# Patient Record
Sex: FEMALE | Race: WHITE | NOT HISPANIC OR LATINO | Employment: UNEMPLOYED | URBAN - METROPOLITAN AREA
[De-identification: names, ages, dates, MRNs, and addresses within clinical notes are randomized per-mention and may not be internally consistent; named-entity substitution may affect disease eponyms.]

---

## 2021-04-23 ENCOUNTER — IMMUNIZATION (OUTPATIENT)
Dept: NURSING | Facility: CLINIC | Age: 31
End: 2021-04-23
Payer: COMMERCIAL

## 2021-04-23 PROCEDURE — 91300 PR COVID VAC PFIZER DIL RECON 30 MCG/0.3 ML IM: CPT

## 2021-04-23 PROCEDURE — 0001A PR COVID VAC PFIZER DIL RECON 30 MCG/0.3 ML IM: CPT

## 2021-05-02 ENCOUNTER — HEALTH MAINTENANCE LETTER (OUTPATIENT)
Age: 31
End: 2021-05-02

## 2021-05-14 ENCOUNTER — IMMUNIZATION (OUTPATIENT)
Dept: NURSING | Facility: CLINIC | Age: 31
End: 2021-05-14
Attending: INTERNAL MEDICINE
Payer: COMMERCIAL

## 2021-05-14 PROCEDURE — 91300 PR COVID VAC PFIZER DIL RECON 30 MCG/0.3 ML IM: CPT

## 2021-05-14 PROCEDURE — 0002A PR COVID VAC PFIZER DIL RECON 30 MCG/0.3 ML IM: CPT

## 2021-10-17 ENCOUNTER — HEALTH MAINTENANCE LETTER (OUTPATIENT)
Age: 31
End: 2021-10-17

## 2021-12-15 ENCOUNTER — IMMUNIZATION (OUTPATIENT)
Dept: NURSING | Facility: CLINIC | Age: 31
End: 2021-12-15
Payer: COMMERCIAL

## 2021-12-15 PROCEDURE — 91300 PR COVID VAC PFIZER DIL RECON 30 MCG/0.3 ML IM: CPT

## 2021-12-15 PROCEDURE — 0004A PR COVID VAC PFIZER DIL RECON 30 MCG/0.3 ML IM: CPT

## 2022-05-29 ENCOUNTER — HEALTH MAINTENANCE LETTER (OUTPATIENT)
Age: 32
End: 2022-05-29

## 2022-08-15 ENCOUNTER — OFFICE VISIT (OUTPATIENT)
Dept: FAMILY MEDICINE | Facility: CLINIC | Age: 32
End: 2022-08-15
Payer: COMMERCIAL

## 2022-08-15 VITALS
WEIGHT: 255 LBS | HEIGHT: 65 IN | BODY MASS INDEX: 42.49 KG/M2 | OXYGEN SATURATION: 97 % | SYSTOLIC BLOOD PRESSURE: 106 MMHG | DIASTOLIC BLOOD PRESSURE: 64 MMHG | HEART RATE: 78 BPM | RESPIRATION RATE: 16 BRPM | TEMPERATURE: 98 F

## 2022-08-15 DIAGNOSIS — Z13.220 LIPID SCREENING: ICD-10-CM

## 2022-08-15 DIAGNOSIS — Z86.39 HISTORY OF VITAMIN D DEFICIENCY: ICD-10-CM

## 2022-08-15 DIAGNOSIS — I78.1 NEVUS, NON-NEOPLASTIC: ICD-10-CM

## 2022-08-15 DIAGNOSIS — D68.59 PROTEIN C DEFICIENCY (H): ICD-10-CM

## 2022-08-15 DIAGNOSIS — L91.8 SKIN TAG: ICD-10-CM

## 2022-08-15 DIAGNOSIS — Z13.1 SCREENING FOR DIABETES MELLITUS: ICD-10-CM

## 2022-08-15 DIAGNOSIS — Z12.4 CERVICAL CANCER SCREENING: ICD-10-CM

## 2022-08-15 DIAGNOSIS — Z00.00 ROUTINE GENERAL MEDICAL EXAMINATION AT A HEALTH CARE FACILITY: Primary | ICD-10-CM

## 2022-08-15 DIAGNOSIS — Z83.2 FAMILY HISTORY OF PROTEIN C DEFICIENCY: Primary | ICD-10-CM

## 2022-08-15 DIAGNOSIS — Z11.59 NEED FOR HEPATITIS C SCREENING TEST: ICD-10-CM

## 2022-08-15 PROBLEM — B00.1 RECURRENT HERPES LABIALIS: Status: ACTIVE | Noted: 2018-06-27

## 2022-08-15 PROBLEM — D17.20 LIPOMA OF UPPER EXTREMITY: Status: ACTIVE | Noted: 2018-06-28

## 2022-08-15 LAB
CHOLEST SERPL-MCNC: 156 MG/DL
DEPRECATED CALCIDIOL+CALCIFEROL SERPL-MC: 23 UG/L (ref 20–75)
FASTING STATUS PATIENT QL REPORTED: YES
GLUCOSE SERPL-MCNC: 100 MG/DL (ref 70–99)
HCV AB SERPL QL IA: NONREACTIVE
HDLC SERPL-MCNC: 31 MG/DL
LDLC SERPL CALC-MCNC: 105 MG/DL
NONHDLC SERPL-MCNC: 125 MG/DL
TRIGL SERPL-MCNC: 101 MG/DL

## 2022-08-15 PROCEDURE — 82306 VITAMIN D 25 HYDROXY: CPT | Performed by: FAMILY MEDICINE

## 2022-08-15 PROCEDURE — 87624 HPV HI-RISK TYP POOLED RSLT: CPT | Performed by: FAMILY MEDICINE

## 2022-08-15 PROCEDURE — 99385 PREV VISIT NEW AGE 18-39: CPT | Performed by: FAMILY MEDICINE

## 2022-08-15 PROCEDURE — 80061 LIPID PANEL: CPT | Performed by: FAMILY MEDICINE

## 2022-08-15 PROCEDURE — G0145 SCR C/V CYTO,THINLAYER,RESCR: HCPCS | Performed by: FAMILY MEDICINE

## 2022-08-15 PROCEDURE — 82947 ASSAY GLUCOSE BLOOD QUANT: CPT | Performed by: FAMILY MEDICINE

## 2022-08-15 PROCEDURE — 36415 COLL VENOUS BLD VENIPUNCTURE: CPT | Performed by: FAMILY MEDICINE

## 2022-08-15 PROCEDURE — 86803 HEPATITIS C AB TEST: CPT | Performed by: FAMILY MEDICINE

## 2022-08-15 RX ORDER — ASPIRIN 81 MG/1
81 TABLET, CHEWABLE ORAL DAILY
COMMUNITY
End: 2022-11-08

## 2022-08-15 ASSESSMENT — ENCOUNTER SYMPTOMS
SORE THROAT: 0
HEMATURIA: 0
PALPITATIONS: 0
EYE PAIN: 0
NERVOUS/ANXIOUS: 0
SHORTNESS OF BREATH: 0
WEAKNESS: 0
HEARTBURN: 0
DIZZINESS: 0
CHILLS: 0
HEMATOCHEZIA: 0
DYSURIA: 0
MYALGIAS: 0
JOINT SWELLING: 0
ABDOMINAL PAIN: 0
COUGH: 0
FREQUENCY: 0
NAUSEA: 0
BREAST MASS: 0
ARTHRALGIAS: 0
DIARRHEA: 0
PARESTHESIAS: 0
FEVER: 0
CONSTIPATION: 0
HEADACHES: 0

## 2022-08-15 NOTE — PROGRESS NOTES
SUBJECTIVE:   CC: Jailyn Mendenhall is an 31 year old woman who presents for preventive health visit.     \  Patient has been advised of split billing requirements and indicates understanding: Yes  Healthy Habits:     Getting at least 3 servings of Calcium per day:  NO    Bi-annual eye exam:  NO    Dental care twice a year:  Yes    Sleep apnea or symptoms of sleep apnea:  None    Diet:  Other    Frequency of exercise:  2-3 days/week    Duration of exercise:  30-45 minutes    Taking medications regularly:  Yes    Medication side effects:  None    PHQ-2 Total Score: 0    Additional concerns today:  Yes    3 children, age 20 months, 3 year, 5 year  Has a few moles, one on left back of her arm, one in sacral area, and two on her back.     History of protein C deficiency  Father history of VTE- she was recommended to have testing  Has previously seen hematologist but has not yet had testing post-pregnancy as recommended    Asthma, mild intermittent  Had to use albuterol one time during one of her pregnancies    Recurrent cold sores  Uses acyclovir prn    Using condoms  Her partner does plan to have vasectomy in the future      Today's PHQ-2 Score:   PHQ-2 ( 1999 [employer]) 8/15/2022   Q1: Little interest or pleasure in doing things 0   Q2: Feeling down, depressed or hopeless 0   PHQ-2 Score 0   Q1: Little interest or pleasure in doing things Not at all   Q2: Feeling down, depressed or hopeless Not at all   PHQ-2 Score 0     Abuse: Current or Past (Physical, Sexual or Emotional) - No  Do you feel safe in your environment? Yes    Have you ever done Advance Care Planning? (For example, a Health Directive, POLST, or a discussion with a medical provider or your loved ones about your wishes): No, advance care planning information given to patient to review.  Patient plans to discuss their wishes with loved ones or provider.      Social History     Tobacco Use     Smoking status: Not on file     Smokeless tobacco: Not on file  "  Substance Use Topics     Alcohol use: Not on file     If you drink alcohol do you typically have >3 drinks per day or >7 drinks per week? No    Alcohol Use 8/15/2022   Prescreen: >3 drinks/day or >7 drinks/week? No   No flowsheet data found.    Reviewed orders with patient.  Reviewed health maintenance and updated orders accordingly - Yes        FHS-7:   Breast CA Risk Assessment (FHS-7) 8/15/2022   Did any of your first-degree relatives have breast or ovarian cancer? No   Did any of your relatives have bilateral breast cancer? No   Did any man in your family have breast cancer? No   Did any woman in your family have breast and ovarian cancer? No   Did any woman in your family have breast cancer before age 50 y? No   Do you have 2 or more relatives with breast and/or ovarian cancer? No   Do you have 2 or more relatives with breast and/or bowel cancer? No       History of abnormal Pap smear: NO - age 30-65 PAP every 5 years with negative HPV co-testing recommended     Reviewed and updated as needed this visit by clinical staff   Tobacco  Allergies  Meds    Surg Hx             Reviewed and updated as needed this visit by Provider   Tobacco  Allergies  Meds  Problems  Med Hx  Surg Hx  Fam Hx             Review of Systems       OBJECTIVE:   /64   Pulse 78   Temp 98  F (36.7  C) (Oral)   Resp 16   Ht 1.651 m (5' 5\")   Wt 115.7 kg (255 lb)   LMP 07/24/2022   SpO2 97%   BMI 42.43 kg/m    Physical Exam  General: Alert, no distress  Eyes: sclera normal  HENT: Normocephalic, no pharyngeal erythema, MMM.  Heart: Normal S1 and S2, regular rhythm. No murmurs, gallops, rubs.  Lungs: CTA bilaterally, no wheezes, no crackles, no rhonchi.  Neuro: No focal deficits   (female): External genitalia is without lesions. Introitus is normal, vaginal walls pink and moist without lesions or evidence of trauma. No cervical lesions or discharge  Extremities: Peripheral pulses intact, no peripheral edema.  Skin: " "Ovoid hyperpigmented macules on back, one in sacral area, left posterior upper arm with skin tag  Psych: Normal mood and affect.      ASSESSMENT/PLAN:     Jailyn was seen today for physical.    Diagnoses and all orders for this visit:    Routine general medical examination at a health care facility: Establish care physical- reviewed all health history and updated in chart.    Need for hepatitis C screening test  -     Hepatitis C Screen Reflex to HCV RNA Quant and Genotype; Future  -     Hepatitis C Screen Reflex to HCV RNA Quant and Genotype    Cervical cancer screening: No history of abnormal pap. Normal pelvic exam. If normal, repeat in 5 years.  -     Pap Screen with HPV - recommended age 30 - 65 years    Lipid screening  -     Lipid panel reflex to direct LDL Fasting; Future  -     Lipid panel reflex to direct LDL Fasting    Screening for diabetes mellitus  -     Glucose; Future  -     Glucose    Protein C deficiency (H): Family history of protein C deficiency in her father. No personal history of VTE. Refer to hematology- she was recommended to have repeat testing after her pregnancy.   -     Adult Oncology/Hematology  Referral; Future    History of vitamin D deficiency  -     Vitamin D deficiency screening; Future  -     Vitamin D deficiency screening    Nevus, non-neoplastic  Skin tag: Benign appearing- offered reassurance. Continue to monitor annually at visits.          COUNSELING:  Reviewed preventive health counseling, as reflected in patient instructions    Estimated body mass index is 42.43 kg/m  as calculated from the following:    Height as of this encounter: 1.651 m (5' 5\").    Weight as of this encounter: 115.7 kg (255 lb).      She reports that she has never smoked. She has never used smokeless tobacco.      Counseling Resources:  ATP IV Guidelines  Pooled Cohorts Equation Calculator  Breast Cancer Risk Calculator  BRCA-Related Cancer Risk Assessment: FHS-7 Tool  FRAX Risk " Assessment  ICSI Preventive Guidelines  Dietary Guidelines for Americans, 2010  USDA's MyPlate  ASA Prophylaxis  Lung CA Screening    Lyric Tenorio MD  [facility]

## 2022-08-18 LAB
BKR LAB AP GYN ADEQUACY: NORMAL
BKR LAB AP GYN INTERPRETATION: NORMAL
BKR LAB AP HPV REFLEX: NORMAL
BKR LAB AP PREVIOUS ABNORMAL: NORMAL
PATH REPORT.COMMENTS IMP SPEC: NORMAL
PATH REPORT.COMMENTS IMP SPEC: NORMAL
PATH REPORT.RELEVANT HX SPEC: NORMAL

## 2022-08-22 LAB
HUMAN PAPILLOMA VIRUS 16 DNA: NEGATIVE
HUMAN PAPILLOMA VIRUS 18 DNA: NEGATIVE
HUMAN PAPILLOMA VIRUS FINAL DIAGNOSIS: NORMAL
HUMAN PAPILLOMA VIRUS OTHER HR: NEGATIVE

## 2022-10-03 ENCOUNTER — HEALTH MAINTENANCE LETTER (OUTPATIENT)
Age: 32
End: 2022-10-03

## 2022-10-11 ENCOUNTER — MYC MEDICAL ADVICE (OUTPATIENT)
Dept: FAMILY MEDICINE | Facility: CLINIC | Age: 32
End: 2022-10-11

## 2022-10-11 NOTE — TELEPHONE ENCOUNTER
Form printed and in Dr. Tenorio's blue folder to be signed.     TAVO GuzmanN, RN  [facility]

## 2022-11-08 ENCOUNTER — OFFICE VISIT (OUTPATIENT)
Dept: INTERNAL MEDICINE | Facility: CLINIC | Age: 32
End: 2022-11-08
Payer: COMMERCIAL

## 2022-11-08 VITALS
HEIGHT: 65 IN | DIASTOLIC BLOOD PRESSURE: 66 MMHG | SYSTOLIC BLOOD PRESSURE: 108 MMHG | WEIGHT: 254.6 LBS | BODY MASS INDEX: 42.42 KG/M2 | HEART RATE: 76 BPM

## 2022-11-08 DIAGNOSIS — Z01.818 PREOPERATIVE EXAMINATION: Primary | ICD-10-CM

## 2022-11-08 DIAGNOSIS — N20.1 URETEROLITHIASIS: ICD-10-CM

## 2022-11-08 DIAGNOSIS — J45.20 MILD INTERMITTENT ASTHMA WITHOUT COMPLICATION: ICD-10-CM

## 2022-11-08 DIAGNOSIS — D68.8 HEREDITARY PROTEIN C DEFICIENCY (H): ICD-10-CM

## 2022-11-08 LAB — HCG UR QL: NEGATIVE

## 2022-11-08 PROCEDURE — 81025 URINE PREGNANCY TEST: CPT | Performed by: NURSE PRACTITIONER

## 2022-11-08 PROCEDURE — 99214 OFFICE O/P EST MOD 30 MIN: CPT | Performed by: NURSE PRACTITIONER

## 2022-11-08 RX ORDER — ONDANSETRON 4 MG/1
4 TABLET, ORALLY DISINTEGRATING ORAL
COMMUNITY
Start: 2022-10-29 | End: 2023-11-06

## 2022-11-08 RX ORDER — MULTIVITAMIN
1 TABLET ORAL EVERY MORNING
COMMUNITY

## 2022-11-08 RX ORDER — TAMSULOSIN HYDROCHLORIDE 0.4 MG/1
0.4 CAPSULE ORAL DAILY
COMMUNITY
Start: 2022-10-29 | End: 2023-10-29

## 2022-11-08 RX ORDER — OXYCODONE HYDROCHLORIDE 5 MG/1
5 TABLET ORAL
COMMUNITY
Start: 2022-10-29 | End: 2023-11-06

## 2022-11-08 RX ORDER — IBUPROFEN 600 MG/1
600 TABLET, FILM COATED ORAL
COMMUNITY
Start: 2022-10-29

## 2022-11-08 ASSESSMENT — ASTHMA QUESTIONNAIRES
ACT_TOTALSCORE: 25
ACT_TOTALSCORE: 25
QUESTION_5 LAST FOUR WEEKS HOW WOULD YOU RATE YOUR ASTHMA CONTROL: COMPLETELY CONTROLLED
QUESTION_3 LAST FOUR WEEKS HOW OFTEN DID YOUR ASTHMA SYMPTOMS (WHEEZING, COUGHING, SHORTNESS OF BREATH, CHEST TIGHTNESS OR PAIN) WAKE YOU UP AT NIGHT OR EARLIER THAN USUAL IN THE MORNING: NOT AT ALL
QUESTION_1 LAST FOUR WEEKS HOW MUCH OF THE TIME DID YOUR ASTHMA KEEP YOU FROM GETTING AS MUCH DONE AT WORK, SCHOOL OR AT HOME: NONE OF THE TIME
QUESTION_2 LAST FOUR WEEKS HOW OFTEN HAVE YOU HAD SHORTNESS OF BREATH: NOT AT ALL
QUESTION_4 LAST FOUR WEEKS HOW OFTEN HAVE YOU USED YOUR RESCUE INHALER OR NEBULIZER MEDICATION (SUCH AS ALBUTEROL): NOT AT ALL

## 2022-11-08 NOTE — PROGRESS NOTES
[address] [address]  Phone: [phone]  Fax: [phone]  Primary Provider: No Ref-Primary, Physician  Pre-op Performing Provider: ALENA QUINONEZ      PREOPERATIVE EVALUATION:  Today's date: 11/8/2022    Jailyn Mendenhall is a 32 year old female who presents for a preoperative evaluation.    Surgical Information:  Surgery/Procedure: HOLMIUM LASER CYSTOSCOPIC REMOVAL URETERAL STONE  Surgery Location: [facility]  Surgeon:    Surgery Date: 11/11/22  Time of Surgery: TBD  Where patient plans to recover: At home with family  Fax number for surgical facility: [phone]    Type of Anesthesia Anticipated: to be determined    Assessment & Plan     The proposed surgical procedure is considered INTERMEDIATE risk.    Problem List Items Addressed This Visit        Respiratory    Mild intermittent asthma     Stable          Relevant Medications    ipratropium-albuterol (COMBIVENT RESPIMAT)  MCG/ACT inhaler       Hematologic    Hereditary protein C deficiency (H), asymptomatic      No personal history of VTE        Other Visit Diagnoses     Preoperative examination    -  Primary    Relevant Orders    HCG qualitative urine (Completed)    Ureterolithiasis        Relevant Medications    oxyCODONE (ROXICODONE) 5 MG tablet        Risks:   - Note increased VTE risk related to hereditary protein C deficiency    Medication Instructions:  - Continue to hold aspirin until after procedure        RECOMMENDATION:  APPROVAL GIVEN to proceed with proposed procedure, without further diagnostic evaluation.        Subjective     HPI related to upcoming procedure: Jailyn Mendenhall has a PMH of ureterolithiasis and was seen in the ED on 10/29 with a c/o left flank pain, nausea. Imaging showed a 5 mm stone.  She has been straining her urine consistently and has not passed the stone.  She is scheduled to undergo ureteroscopy.  She no longer has flank  pain, no hematuria.    Asthma has been well controlled, no recent use of albuterol inhaler.    Preop Questions 11/8/2022   1. Have you ever had a heart attack or stroke? No   2. Have you ever had surgery on your heart or blood vessels, such as a stent placement, a coronary artery bypass, or surgery on an artery in your head, neck, heart, or legs? No   3. Do you have chest pain with activity? No   4. Do you have a history of  heart failure? No   5. Do you currently have a cold, bronchitis or symptoms of other infection? No   6. Do you have a cough, shortness of breath, or wheezing? No   7. Do you or anyone in your family have previous history of blood clots? YES - Her father has Protein C deficiency and she also has Protein C deficiency. Takes aspirin typically but stopped about 2 weeks ago. She does not have a personal history of VTE    8. Do you or does anyone in your family have a serious bleeding problem such as prolonged bleeding following surgeries or cuts? YES - her father has + bleeding dyscrasia associated with anticoagulation therapy taken for Protein C deficiency    9. Have you ever had problems with anemia or been told to take iron pills? No   10. Have you had any abnormal blood loss such as black, tarry or bloody stools, or abnormal vaginal bleeding? No   11. Have you ever had a blood transfusion? No   12. Are you willing to have a blood transfusion if it is medically needed before, during, or after your surgery? Yes   13. Have you or any of your relatives ever had problems with anesthesia? No   14. Do you have sleep apnea, excessive snoring or daytime drowsiness? No   15. Do you have any artifical heart valves or other implanted medical devices like a pacemaker, defibrillator, or continuous glucose monitor? No   16. Do you have artificial joints? No   17. Are you allergic to latex? No    18. Is there any chance that you may be pregnant? No        Preoperative Review of :   reviewed - controlled  substances reflected in medication list.- oxycodone prescribed related to stone on 10/29/22. No concerns       Review of Systems  CONSTITUTIONAL: NEGATIVE for fever, chills, change in weight  INTEGUMENTARY/SKIN: NEGATIVE for worrisome rashes, moles or lesions  EYES: NEGATIVE for vision changes or irritation  ENT/MOUTH: NEGATIVE for ear, mouth and throat problems  RESP: NEGATIVE for significant cough or SOB  CV: NEGATIVE for chest pain, palpitations or peripheral edema  GI: NEGATIVE for nausea, abdominal pain, heartburn, or change in bowel habits   : NEGATIVE for dysuria, or hematuria +urinary urgency and frequency   MUSCULOSKELETAL: NEGATIVE for significant arthralgias or myalgia  NEURO: NEGATIVE for weakness, dizziness or paresthesias  ENDOCRINE: NEGATIVE for temperature intolerance, skin/hair changes  HEME: NEGATIVE for bleeding problems  PSYCHIATRIC: NEGATIVE for changes in mood or affect    Patient Active Problem List    Diagnosis Date Noted     Hereditary protein C deficiency (H), asymptomatic  11/09/2022     Priority: Medium     No personal history of VTE.  Diagnosed due to family history in 2016.       Lipoma of upper extremity 06/28/2018     Priority: Medium     Formatting of this note might be different from the original.  Careplan:  Background:  6/28/2018: bilateral forearm non-tender lumps, about 1 cm consistent with lipoma    Goals/Recommendations:  6/28/2018: Education done. Please let me know if these get bigger, painful, or change.       Recurrent herpes labialis 06/27/2018     Priority: Medium     Formatting of this note might be different from the original.  Careplan:  Background:  6/28/2018: stable on prn acyclovir for rare outbreak    Goals/Recommendations:  6/28/2018: Refilled: -     acyclovir (ZOVIRAX) 400 MG tablet; 2 tabs bid at onset of herpes labialis symptoms and continue for 2-5 days.  Keep Rx on file for future fill       Dysmenorrhea 11/15/2010     Priority: Medium     Formatting of  this note might be different from the original.  Careplan:  Background:  5/18/2016: no current complaints off oral contraceptive pills     Goals/Recommendations:  5/18/2016: monitor       Mild intermittent asthma 08/08/2007     Priority: Medium     Formatting of this note might be different from the original.  Careplan:  Background:  6/28/2018, 5/18/2016: Triggers: exercise induced    Goals/Recommendations:   6/28/2018: Refilled: -     ALBUterol sulfate HFA (VENTOLIN HFA) 108 (90 Base) MCG/ACT inhaler; Inhale 2 Puffs every 4 hours as needed for Wheezing. cough, and prior to exercise.  5/18/2016: AMP updated       Idiopathic postprandial hypoglycemia 01/18/2006     Priority: Medium      Past Medical History:   Diagnosis Date     Protein C deficiency (H)      Uncomplicated asthma      Past Surgical History:   Procedure Laterality Date     TONSILLECTOMY & ADENOIDECTOMY      7-8 years     WISDOM TOOTH EXTRACTION      17-18 years     Current Outpatient Medications   Medication Sig Dispense Refill     aspirin (ASA) 81 MG EC tablet Take 81 mg by mouth       ibuprofen (ADVIL/MOTRIN) 600 MG tablet Take 600 mg by mouth       ipratropium-albuterol (COMBIVENT RESPIMAT)  MCG/ACT inhaler Inhale 1 puff into the lungs 4 times daily as needed       multivitamin (ONE-DAILY) tablet Take 1 tablet by mouth every morning       ondansetron (ZOFRAN ODT) 4 MG ODT tab Take 4 mg by mouth       oxyCODONE (ROXICODONE) 5 MG tablet Take 5 mg by mouth       tamsulosin (FLOMAX) 0.4 MG capsule Take 0.4 mg by mouth daily         No Known Allergies     Social History     Tobacco Use     Smoking status: Never     Smokeless tobacco: Never   Substance Use Topics     Alcohol use: Not on file     Family History   Problem Relation Age of Onset     No Known Problems Mother      Protein C deficiency Father      Testicular cancer Father      Deep Vein Thrombosis Father      Diabetes Type 2  Maternal Grandmother      Colon Cancer Paternal Grandmother       "   over age 50     Lung Cancer Paternal Grandfather         over age 70, history of tobacco use     Protein C deficiency Brother      No Known Problems Brother      No Known Problems Sister      Asthma Son      Eczema Son      Kidney Disease Daughter         grade 5 kidney reflux, s/p surgery, left sided     History   Drug Use     Comment: occasional         Objective     /66 (BP Location: Right arm, Patient Position: Sitting, Cuff Size: Adult Large)   Pulse 76   Ht 1.638 m (5' 4.5\")   Wt 115.5 kg (254 lb 9.6 oz)   LMP 10/13/2022 (Exact Date)   BMI 43.03 kg/m      Physical Exam    GENERAL APPEARANCE: healthy, alert and no distress     EYES: EOMI     HENT: ear canals and TM's normal and nose and mouth without ulcers or lesions     NECK: no adenopathy, no asymmetry, masses, or scars and thyroid normal to palpation     RESP: lungs clear to auscultation - no rales, rhonchi or wheezes     CV: regular rates and rhythm, normal S1 S2, no S3 or S4 and no murmur, click or rub     ABDOMEN:  soft, nontender, no HSM or masses and bowel sounds normal     MS: extremities normal- no gross deformities noted, no evidence of inflammation in joints, FROM in all extremities.      NEURO: Normal strength and tone, sensory exam grossly normal, mentation intact and speech normal     PSYCH: mentation appears normal. and affect normal/bright     LYMPHATICS: No cervical adenopathy        Diagnostics:  Recent Results (from the past 48 hour(s))   HCG qualitative urine    Collection Time: 11/08/22 10:57 AM   Result Value Ref Range    hCG Urine Qualitative Negative Negative        Revised Cardiac Risk Index (RCRI):  The patient has the following serious cardiovascular risks for perioperative complications:   - No serious cardiac risks = 0 points     RCRI Interpretation: 0 points: Class I (very low risk - 0.4% complication rate)           Signed Electronically by: Lisandra Singh NP  Copy of this evaluation report is provided to requesting " physician.

## 2022-11-09 PROBLEM — D68.8: Status: ACTIVE | Noted: 2022-11-09

## 2022-12-30 ENCOUNTER — LAB (OUTPATIENT)
Dept: LAB | Facility: CLINIC | Age: 32
End: 2022-12-30
Payer: COMMERCIAL

## 2022-12-30 DIAGNOSIS — Z83.2 FAMILY HISTORY OF PROTEIN C DEFICIENCY: ICD-10-CM

## 2022-12-30 PROCEDURE — 85303 CLOT INHIBIT PROT C ACTIVITY: CPT

## 2022-12-30 PROCEDURE — 36415 COLL VENOUS BLD VENIPUNCTURE: CPT

## 2023-01-02 LAB — PROT C ACT/NOR PPP CHRO: 51 % (ref 70–170)

## 2023-10-21 ENCOUNTER — HEALTH MAINTENANCE LETTER (OUTPATIENT)
Age: 33
End: 2023-10-21

## 2023-11-06 ENCOUNTER — MYC MEDICAL ADVICE (OUTPATIENT)
Dept: FAMILY MEDICINE | Facility: CLINIC | Age: 33
End: 2023-11-06

## 2023-11-06 ENCOUNTER — OFFICE VISIT (OUTPATIENT)
Dept: FAMILY MEDICINE | Facility: CLINIC | Age: 33
End: 2023-11-06
Payer: COMMERCIAL

## 2023-11-06 VITALS
DIASTOLIC BLOOD PRESSURE: 74 MMHG | SYSTOLIC BLOOD PRESSURE: 105 MMHG | BODY MASS INDEX: 41.95 KG/M2 | OXYGEN SATURATION: 97 % | HEIGHT: 65 IN | HEART RATE: 76 BPM | WEIGHT: 251.8 LBS | RESPIRATION RATE: 14 BRPM | TEMPERATURE: 98.4 F

## 2023-11-06 DIAGNOSIS — Z13.220 SCREENING FOR LIPID DISORDERS: ICD-10-CM

## 2023-11-06 DIAGNOSIS — Z13.1 SCREENING FOR DIABETES MELLITUS: ICD-10-CM

## 2023-11-06 DIAGNOSIS — E66.01 MORBID OBESITY (H): ICD-10-CM

## 2023-11-06 DIAGNOSIS — Z23 NEED FOR VACCINATION: ICD-10-CM

## 2023-11-06 DIAGNOSIS — D68.8 HEREDITARY PROTEIN C DEFICIENCY (H): ICD-10-CM

## 2023-11-06 DIAGNOSIS — J45.20 MILD INTERMITTENT ASTHMA WITHOUT COMPLICATION: ICD-10-CM

## 2023-11-06 DIAGNOSIS — Z00.00 ROUTINE GENERAL MEDICAL EXAMINATION AT A HEALTH CARE FACILITY: Primary | ICD-10-CM

## 2023-11-06 DIAGNOSIS — F43.21 ADJUSTMENT DISORDER WITH DEPRESSED MOOD: ICD-10-CM

## 2023-11-06 PROBLEM — N20.1 URETERAL STONE: Status: ACTIVE | Noted: 2022-11-04

## 2023-11-06 LAB
ALBUMIN SERPL BCG-MCNC: 4.1 G/DL (ref 3.5–5.2)
ALP SERPL-CCNC: 57 U/L (ref 35–104)
ALT SERPL W P-5'-P-CCNC: 17 U/L (ref 0–50)
ANION GAP SERPL CALCULATED.3IONS-SCNC: 10 MMOL/L (ref 7–15)
AST SERPL W P-5'-P-CCNC: 14 U/L (ref 0–45)
BILIRUB SERPL-MCNC: 0.3 MG/DL
BUN SERPL-MCNC: 13.6 MG/DL (ref 6–20)
CALCIUM SERPL-MCNC: 9.2 MG/DL (ref 8.6–10)
CHLORIDE SERPL-SCNC: 103 MMOL/L (ref 98–107)
CHOLEST SERPL-MCNC: 165 MG/DL
CREAT SERPL-MCNC: 0.71 MG/DL (ref 0.51–0.95)
DEPRECATED HCO3 PLAS-SCNC: 23 MMOL/L (ref 22–29)
EGFRCR SERPLBLD CKD-EPI 2021: >90 ML/MIN/1.73M2
GLUCOSE SERPL-MCNC: 94 MG/DL (ref 70–99)
HDLC SERPL-MCNC: 33 MG/DL
LDLC SERPL CALC-MCNC: 106 MG/DL
NONHDLC SERPL-MCNC: 132 MG/DL
POTASSIUM SERPL-SCNC: 4.1 MMOL/L (ref 3.4–5.3)
PROT SERPL-MCNC: 7.3 G/DL (ref 6.4–8.3)
SODIUM SERPL-SCNC: 136 MMOL/L (ref 135–145)
TRIGL SERPL-MCNC: 131 MG/DL

## 2023-11-06 PROCEDURE — 80053 COMPREHEN METABOLIC PANEL: CPT | Performed by: FAMILY MEDICINE

## 2023-11-06 PROCEDURE — 91320 SARSCV2 VAC 30MCG TRS-SUC IM: CPT | Performed by: FAMILY MEDICINE

## 2023-11-06 PROCEDURE — 90471 IMMUNIZATION ADMIN: CPT | Performed by: FAMILY MEDICINE

## 2023-11-06 PROCEDURE — 90686 IIV4 VACC NO PRSV 0.5 ML IM: CPT | Performed by: FAMILY MEDICINE

## 2023-11-06 PROCEDURE — 90480 ADMN SARSCOV2 VAC 1/ONLY CMP: CPT | Performed by: FAMILY MEDICINE

## 2023-11-06 PROCEDURE — 80061 LIPID PANEL: CPT | Performed by: FAMILY MEDICINE

## 2023-11-06 PROCEDURE — 36415 COLL VENOUS BLD VENIPUNCTURE: CPT | Performed by: FAMILY MEDICINE

## 2023-11-06 PROCEDURE — 99395 PREV VISIT EST AGE 18-39: CPT | Mod: 25 | Performed by: FAMILY MEDICINE

## 2023-11-06 PROCEDURE — 99214 OFFICE O/P EST MOD 30 MIN: CPT | Mod: 25 | Performed by: FAMILY MEDICINE

## 2023-11-06 ASSESSMENT — ENCOUNTER SYMPTOMS
DIARRHEA: 0
JOINT SWELLING: 0
CONSTIPATION: 0
PARESTHESIAS: 0
MYALGIAS: 0
DIZZINESS: 0
FREQUENCY: 0
SORE THROAT: 0
HEARTBURN: 0
COUGH: 0
EYE PAIN: 0
CHILLS: 0
PALPITATIONS: 0
NERVOUS/ANXIOUS: 0
HEMATURIA: 0
HEADACHES: 0
NAUSEA: 0
FEVER: 0
DYSURIA: 0
HEMATOCHEZIA: 0
SHORTNESS OF BREATH: 0
ABDOMINAL PAIN: 0
WEAKNESS: 0
ARTHRALGIAS: 0
BREAST MASS: 0

## 2023-11-06 ASSESSMENT — ASTHMA QUESTIONNAIRES: ACT_TOTALSCORE: 25

## 2023-11-06 ASSESSMENT — PATIENT HEALTH QUESTIONNAIRE - PHQ9: SUM OF ALL RESPONSES TO PHQ QUESTIONS 1-9: 9

## 2023-11-06 NOTE — LETTER
My Asthma Action Plan    Name: Jailyn Mendenhall   YOB: 1990  Date: 11/6/2023   My doctor: Susan Barboza MD   My clinic: [facility] [place]        My Rescue Medicine:   Albuterol inhaler (Proair/Ventolin/Proventil HFA)  2-4 puffs EVERY 4 HOURS as needed. Use a spacer if recommended by your provider.   My Asthma Severity:   Intermittent / Exercise Induced  Know your asthma triggers: upper respiratory infections             GREEN ZONE   Good Control  I feel good  No cough or wheeze  Can work, sleep and play without asthma symptoms       Take your asthma control medicine every day.     If exercise triggers your asthma, take your rescue medication  15 minutes before exercise or sports, and  During exercise if you have asthma symptoms  Spacer to use with inhaler: If you have a spacer, make sure to use it with your inhaler             YELLOW ZONE Getting Worse  I have ANY of these:  I do not feel good  Cough or wheeze  Chest feels tight  Wake up at night   Keep taking your Green Zone medications  Start taking your rescue medicine:  every 20 minutes for up to 1 hour. Then every 4 hours for 24-48 hours.  If you stay in the Yellow Zone for more than 12-24 hours, contact your doctor.  If you do not return to the Green Zone in 12-24 hours or you get worse, start taking your oral steroid medicine if prescribed by your provider.           RED ZONE Medical Alert - Get Help  I have ANY of these:  I feel awful  Medicine is not helping  Breathing getting harder  Trouble walking or talking  Nose opens wide to breathe       Take your rescue medicine NOW  If your provider has prescribed an oral steroid medicine, start taking it NOW  Call your doctor NOW  If you are still in the Red Zone after 20 minutes and you have not reached your doctor:  Take your rescue medicine again and  Call 911 or go to the emergency room right away    See your regular doctor within 2 weeks of an Emergency Room or  Urgent Care visit for follow-up treatment.          Annual Reminders:  Meet with Asthma Educator,  Flu Shot in the Fall, consider Pneumonia Vaccination for patients with asthma (aged 19 and older).    [id number] [address] AT [facility] [address]    Electronically signed by Susan Barboza MD   Date: 11/06/23                    Asthma Triggers  How To Control Things That Make Your Asthma Worse    Triggers are things that make your asthma worse.  Look at the list below to help you find your triggers and   what you can do about them. You can help prevent asthma flare-ups by staying away from your triggers.      Trigger                                                          What you can do   Cigarette Smoke  Tobacco smoke can make asthma worse. Do not allow smoking in your home, car or around you.  Be sure no one smokes at a child s day care or school.  If you smoke, ask your health care provider for ways to help you quit.  Ask family members to quit too.  Ask your health care provider for a referral to Quit Plan to help you quit smoking, or call 8-185-363-PLAN.     Colds, Flu, Bronchitis  These are common triggers of asthma. Wash your hands often.  Don t touch your eyes, nose or mouth.  Get a flu shot every year.     Dust Mites  These are tiny bugs that live in cloth or carpet. They are too small to see. Wash sheets and blankets in hot water every week.   Encase pillows and mattress in dust mite proof covers.  Avoid having carpet if you can. If you have carpet, vacuum weekly.   Use a dust mask and HEPA vacuum.   Pollen and Outdoor Mold  Some people are allergic to trees, grass, or weed pollen, or molds. Try to keep your windows closed.  Limit time out doors when pollen count is high.   Ask you health care provider about taking medicine during allergy season.     Animal Dander  Some people are allergic to skin flakes, urine or saliva from pets  with fur or feathers. Keep pets with fur or feathers out of your home.    If you can t keep the pet outdoors, then keep the pet out of your bedroom.  Keep the bedroom door closed.  Keep pets off cloth furniture and away from stuffed toys.     Mice, Rats, and Cockroaches  Some people are allergic to the waste from these pests.   Cover food and garbage.  Clean up spills and food crumbs.  Store grease in the refrigerator.   Keep food out of the bedroom.   Indoor Mold  This can be a trigger if your home has high moisture. Fix leaking faucets, pipes, or other sources of water.   Clean moldy surfaces.  Dehumidify basement if it is damp and smelly.   Smoke, Strong Odors, and Sprays  These can reduce air quality. Stay away from strong odors and sprays, such as perfume, powder, hair spray, paints, smoke incense, paint, cleaning products, candles and new carpet.   Exercise or Sports  Some people with asthma have this trigger. Be active!  Ask your doctor about taking medicine before sports or exercise to prevent symptoms.    Warm up for 5-10 minutes before and after sports or exercise.     Other Triggers of Asthma  Cold air:  Cover your nose and mouth with a scarf.  Sometimes laughing or crying can be a trigger.  Some medicines and food can trigger asthma.

## 2023-11-06 NOTE — PROGRESS NOTES
SUBJECTIVE:   CC: Jailyn is an 33 year old who presents for preventive health visit.       11/6/2023     7:26 AM   Additional Questions   Roomed by monica simon       Healthy Habits:     Getting at least 3 servings of Calcium per day:  Yes    Bi-annual eye exam:  NO    Dental care twice a year:  Yes    Sleep apnea or symptoms of sleep apnea:  None    Diet:  Regular (no restrictions)    Frequency of exercise:  2-3 days/week    Duration of exercise:  15-30 minutes    Taking medications regularly:  Yes    Medication side effects:  None    Additional concerns today:  No      Today's PHQ-2 Score:       11/6/2023     7:20 AM   PHQ-2 ( 1999 [employer])   Q1: Little interest or pleasure in doing things 1   Q2: Feeling down, depressed or hopeless 1   PHQ-2 Score 2   Q1: Little interest or pleasure in doing things Several days   Q2: Feeling down, depressed or hopeless Several days   PHQ-2 Score 2     Weight loss  - Has been trying diet and exercise, but no change in weight  - interested in discussing other options, mom had weight loss surgery but she is not interested in this at this time    Mood  - has felt down,   - biggest supports , parents, close friends    Protein C deficiency  - was tested after pregnancy, was told it was mild and should be taking aspirin daily  - occasionally forgets aspirin  - didn't see a hematologist after testing here    Kids age 3, 4, 6  Substitute teach once a week; 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    1. Little interest or pleasure in doing things -  Several days   2. Feeling down, depressed, or hopeless -  Several days   3. Trouble falling or staying asleep, or sleeping too much - Several days   4. Feeling tired or having little energy -  More than half the days   5. Poor appetite or overeating -  Several days   6. Feeling bad about  yourself - or that you are a failure or have let yourself or your family down -  Nearly every day   7. Trouble concentrating on things, such as reading the newspaper or watching television - Not at all   8. Moving or speaking so slowly that other people could have noticed? Or the opposite - being so fidgety or restless that you have been moving around a lot more than usual Not at all   9. Thoughts that you would be better off dead or of hurting  yourself in some way Not at all   Total Score: 9     If you checked off any problems, how difficult have these problems made it for you to do your work, take care of things at home, or get along with other people?      Developed by Gia Damon, Sofi Rossi, Hemal Hernandez and colleagues, with an educational norma from [employer] No permission required to reproduce, translate, display or distribute. permission required to reproduce, translate, display or distribute.                    Social History     Tobacco Use    Smoking status: Never     Passive exposure: Never    Smokeless tobacco: Never   Substance Use Topics    Alcohol use: Not Currently     Comment: very rare             11/6/2023     7:19 AM   Alcohol Use   Prescreen: >3 drinks/day or >7 drinks/week? No     Reviewed orders with patient.  Reviewed health maintenance and updated orders accordingly - Yes  Lab work is in process  Labs reviewed in EPIC    Breast Cancer Screening:    FHS-7:       8/15/2022     7:45 AM 11/8/2022    10:13 AM 11/6/2023     7:22 AM   Breast CA Risk Assessment (FHS-7)   Did any of your first-degree relatives have breast or ovarian cancer? No No No   Did any of your relatives have bilateral breast cancer? No No No   Did any man in your family have breast cancer? No No No   Did any woman in your family have breast and ovarian cancer? No Yes Yes   Did any woman in your family have breast cancer before age 50 y? No No Yes   Do you have 2 or more relatives with breast and/or  ovarian cancer? No No No   Do you have 2 or more relatives with breast and/or bowel cancer? No Yes Yes   **Reviewed answers with patient and she now recalls that she does not think any woman has had breast o ovarian cancer, no one prior to age 50, and she does not have 2 or more relatives with breast and/or bowel cancer.     Patient under 40 years of age: Routine Mammogram Screening not recommended.   Pertinent mammograms are reviewed under the imaging tab.    History of abnormal Pap smear: NO - age 30- 65 PAP every 3 years recommended      Latest Ref Rng & Units 8/15/2022     8:13 AM   PAP / HPV   PAP  Negative for Intraepithelial Lesion or Malignancy (NILM)    HPV 16 DNA Negative Negative    HPV 18 DNA Negative Negative    Other HR HPV Negative Negative      Reviewed and updated as needed this visit by clinical staff   Tobacco  Allergies  Meds   Med Hx  Surg Hx  Fam Hx          Reviewed and updated as needed this visit by Provider   Tobacco     Med Hx  Surg Hx  Fam Hx         Past Medical History:   Diagnosis Date    Protein C deficiency (H24)     Uncomplicated asthma       Past Surgical History:   Procedure Laterality Date    KIDNEY STONE SURGERY  2022    TONSILLECTOMY & ADENOIDECTOMY      7-8 years    WISDOM TOOTH EXTRACTION      17-18 years     OB History    Para Term  AB Living   3 3 3 0 0 3   SAB IAB Ectopic Multiple Live Births   0 0 0 0 3      # Outcome Date GA Lbr Iban/2nd Weight Sex Delivery Anes PTL Lv   3 Term 20 40w2d 09:30 / 00:14 3.92 kg (8 lb 10.3 oz) F Vag-Spont  N BERTHA      Name: BASSEM,BABYGIRL MARIA VICTORIA      Apgar1: 9  Apgar5: 9   2 Term 19 39w1d 03:41 / 00:02 4.168 kg (9 lb 3 oz) M Vag-Spont   BERTHA      Name: BASSEMBABYBOY MARIA VICTORIA      Apgar1: 9  Apgar5: 9   1 Term 17 39w6d  3.43 kg (7 lb 9 oz) F Vag-Spont EPI, Local N BERTHA      Birth Comments: Mother is A+Bili 2.6Hearing passed      Name: Aye      Apgar1: 9  Apgar5: 9      Obstetric Comments   Had  "polyhydramnios during one pregnancy; no GDM, no HTN       Review of Systems   Constitutional:  Negative for chills and fever.   HENT:  Negative for congestion, ear pain, hearing loss and sore throat.    Eyes:  Negative for pain and visual disturbance.   Respiratory:  Negative for cough and shortness of breath.    Cardiovascular:  Negative for chest pain, palpitations and peripheral edema.   Gastrointestinal:  Negative for abdominal pain, constipation, diarrhea, heartburn, hematochezia and nausea.   Breasts:  Negative for tenderness, breast mass and discharge.   Genitourinary:  Negative for dysuria, frequency, genital sores, hematuria, pelvic pain, urgency, vaginal bleeding and vaginal discharge.   Musculoskeletal:  Negative for arthralgias, joint swelling and myalgias.   Skin:  Negative for rash.   Neurological:  Negative for dizziness, weakness, headaches and paresthesias.   Psychiatric/Behavioral:  Negative for mood changes. The patient is not nervous/anxious.           OBJECTIVE:   /74   Pulse 76   Temp 98.4  F (36.9  C) (Oral)   Resp 14   Ht 1.655 m (5' 5.16\")   Wt 114.2 kg (251 lb 12.8 oz)   LMP 10/18/2023   SpO2 97%   BMI 41.70 kg/m    Wt Readings from Last 3 Encounters:   11/06/23 114.2 kg (251 lb 12.8 oz)   11/08/22 115.5 kg (254 lb 9.6 oz)   08/15/22 115.7 kg (255 lb)       Physical Exam  GENERAL: healthy, alert and no distress  EYES: Eyes grossly normal to inspection, PERRL and conjunctivae and sclerae normal  HENT: ear canals and TM's normal, nose and mouth without ulcers or lesions  NECK: no adenopathy, no asymmetry, masses, or scars and thyroid normal to palpation  RESP: lungs clear to auscultation - no rales, rhonchi or wheezes  BREAST: normal without masses, tenderness or nipple discharge and no palpable axillary masses or adenopathy, skin tags in right axilla  CV: regular rate and rhythm, normal S1 S2, no S3 or S4, no murmur, click or rub, no peripheral edema and peripheral pulses " strong  ABDOMEN: soft, nontender, no hepatosplenomegaly, no masses and bowel sounds normal  MS: no gross musculoskeletal defects noted, no edema  SKIN: no suspicious lesions or rashes, scattered nevi over back  NEURO: Normal strength and tone, mentation intact and speech normal  PSYCH: mentation appears normal, affect normal/bright        ASSESSMENT/PLAN:   (Z00.00) Routine general medical examination at a health care facility  (primary encounter diagnosis)  Comment: Presenting for routine annual preventive visit and to establish care with new PCP. History notable for question of breast cancer history in family but on review of FHS questions, she does not think there is. She will ask her mom for clarification and let us know through [facility].  Plan: Labs, screenings, and vaccines as ordered and counseling as detailed below.    (Z23) Need for vaccination  Comment:   Plan: INFLUENZA VACCINE IM > 6 MONTHS VALENT IIV4         (AFLURIA/FLUZONE), COVID-19 12+ (2023-24)         (PFIZER)            (Z13.220) Screening for lipid disorders  Comment: Borderline cholesterol at last check 8/2022  Plan: Lipid panel reflex to direct LDL Fasting            (Z13.1) Screening for diabetes mellitus  Comment: Fasting glucose was 100 at last check 8/2022, borderline for prediabetes, will recheck today.  Plan: Glucose            (E66.01) Morbid obesity (H)  Comment: BMI is 41.7, and weight is not decreasing with lifestyle changes. She is interested in different weight loss medication options. Discussed starting medication with close monitoring in clinic versus referral to weight loss management. She would like to try medication through clinic. Given concurrent depressive symptoms and predominant cravings, contrave is a good option. Normal BP and HR. No anxiety. No history of hepatic or renal impairment - check labs today. She is not on chronic opioids, no planned surgery. Discussed importance of letting providers know she is taking this  "medication if she does need emergent surgery. Start dosing as follows:  Week 1: 1 tablet (8 mg naltrexone/90 mg bupropion) once daily.  Week 2: 1 tablet twice daily.  Week 3: 2 tablets in morning and one tablet in evening.  Week 4: 2 tablets twice daily.   If not covered by insurance, can split into naltrexone and bupropion. Reviewed common side effects. Continue lifestyle measures. Follow up in 1-2 mos.  Plan: Comprehensive metabolic panel (BMP + Alb, Alk         Phos, ALT, AST, Total. Bili, TP),         naltrexone-bupropion (CONTRAVE) 8-90 MG per 12         hr tablet    (F43.21) Adjustment disorder with depressed mood  Comment: Report some depressive symptoms, no SI.  Plan: Start bupropion as above, declines counseling at this time.    (J45.20) Mild intermittent asthma without complication  Comment: Stable, does not use combivent frequently - only when she has a respiratory infection.  Plan: Given updated asthma action plan    (D68.8) Hereditary protein C deficiency (H), asymptomatic   Comment: Father had protein C deficiency with DVT. No personal history of VTE. Testing done 12/30/2022 showed decresed Protein C activity of 51%. She has not seen a hematologist.  Plan: Refer to hematology with following questions - need for additional testing? Need to take aspirin daily?    Patient has been advised of split billing requirements and indicates understanding: Yes        COUNSELING:  Reviewed preventive health counseling, as reflected in patient instructions  Special attention given to:        Regular exercise       Healthy diet/nutrition       Vision screening       Contraception       Family planning      BMI:   Estimated body mass index is 41.7 kg/m  as calculated from the following:    Height as of this encounter: 1.655 m (5' 5.16\").    Weight as of this encounter: 114.2 kg (251 lb 12.8 oz).   Weight management plan: Discussed healthy diet and exercise guidelines See problem above      She reports that she has " never smoked. She has never been exposed to tobacco smoke. She has never used smokeless tobacco.          Susan Barboza MD  [facility]

## 2023-11-08 ENCOUNTER — PATIENT OUTREACH (OUTPATIENT)
Dept: ONCOLOGY | Facility: CLINIC | Age: 33
End: 2023-11-08
Payer: COMMERCIAL

## 2023-11-08 ENCOUNTER — MYC MEDICAL ADVICE (OUTPATIENT)
Dept: FAMILY MEDICINE | Facility: CLINIC | Age: 33
End: 2023-11-08
Payer: COMMERCIAL

## 2023-11-08 DIAGNOSIS — Z80.3 FAMILY HISTORY OF MALIGNANT NEOPLASM OF BREAST: Primary | ICD-10-CM

## 2023-11-08 DIAGNOSIS — E66.01 MORBID OBESITY (H): ICD-10-CM

## 2023-11-08 NOTE — PROGRESS NOTES
Writer received Cancer Risk Management Program referral, referred for:     34yo female with moderate risk of breast cancer based on family history: two maternal aunts with breast cancer; also with extensive family history of cancer on both sides - need for BRCA testing or consideration of other cancer syndromes?      Reviewed for appropriate plan, and sent to New Patient Scheduling for completion.

## 2023-11-14 RX ORDER — NALTREXONE HYDROCHLORIDE 50 MG/1
50 TABLET, FILM COATED ORAL DAILY
Qty: 15 TABLET | Refills: 1 | Status: SHIPPED | OUTPATIENT
Start: 2023-11-14 | End: 2024-01-04

## 2023-11-14 RX ORDER — BUPROPION HYDROCHLORIDE 100 MG/1
100 TABLET, EXTENDED RELEASE ORAL 2 TIMES DAILY
Qty: 60 TABLET | Refills: 1 | Status: SHIPPED | OUTPATIENT
Start: 2023-11-14 | End: 2024-01-04

## 2023-11-14 RX ORDER — BUPROPION HYDROCHLORIDE 100 MG/1
100 TABLET, EXTENDED RELEASE ORAL 2 TIMES DAILY
Qty: 60 TABLET | Refills: 1 | Status: SHIPPED | OUTPATIENT
Start: 2023-11-14 | End: 2023-11-14

## 2024-01-04 ENCOUNTER — OFFICE VISIT (OUTPATIENT)
Dept: FAMILY MEDICINE | Facility: CLINIC | Age: 34
End: 2024-01-04
Payer: COMMERCIAL

## 2024-01-04 VITALS
OXYGEN SATURATION: 97 % | RESPIRATION RATE: 16 BRPM | SYSTOLIC BLOOD PRESSURE: 106 MMHG | DIASTOLIC BLOOD PRESSURE: 73 MMHG | WEIGHT: 243 LBS | HEART RATE: 85 BPM | TEMPERATURE: 98.8 F | HEIGHT: 65 IN | BODY MASS INDEX: 40.48 KG/M2

## 2024-01-04 DIAGNOSIS — E66.01 MORBID OBESITY (H): ICD-10-CM

## 2024-01-04 LAB
ALBUMIN SERPL BCG-MCNC: 4.3 G/DL (ref 3.5–5.2)
ALP SERPL-CCNC: 59 U/L (ref 40–150)
ALT SERPL W P-5'-P-CCNC: 16 U/L (ref 0–50)
ANION GAP SERPL CALCULATED.3IONS-SCNC: 12 MMOL/L (ref 7–15)
AST SERPL W P-5'-P-CCNC: 14 U/L (ref 0–45)
BILIRUB SERPL-MCNC: 0.4 MG/DL
BUN SERPL-MCNC: 12.1 MG/DL (ref 6–20)
CALCIUM SERPL-MCNC: 9.3 MG/DL (ref 8.6–10)
CHLORIDE SERPL-SCNC: 101 MMOL/L (ref 98–107)
CREAT SERPL-MCNC: 0.83 MG/DL (ref 0.51–0.95)
DEPRECATED HCO3 PLAS-SCNC: 23 MMOL/L (ref 22–29)
EGFRCR SERPLBLD CKD-EPI 2021: >90 ML/MIN/1.73M2
GLUCOSE SERPL-MCNC: 106 MG/DL (ref 70–99)
POTASSIUM SERPL-SCNC: 4.1 MMOL/L (ref 3.4–5.3)
PROT SERPL-MCNC: 7.5 G/DL (ref 6.4–8.3)
SODIUM SERPL-SCNC: 136 MMOL/L (ref 135–145)

## 2024-01-04 PROCEDURE — 80053 COMPREHEN METABOLIC PANEL: CPT | Performed by: FAMILY MEDICINE

## 2024-01-04 PROCEDURE — 99213 OFFICE O/P EST LOW 20 MIN: CPT | Performed by: FAMILY MEDICINE

## 2024-01-04 PROCEDURE — 36415 COLL VENOUS BLD VENIPUNCTURE: CPT | Performed by: FAMILY MEDICINE

## 2024-01-04 RX ORDER — BUPROPION HYDROCHLORIDE 100 MG/1
200 TABLET, EXTENDED RELEASE ORAL 2 TIMES DAILY
Qty: 120 TABLET | Refills: 1 | Status: SHIPPED | OUTPATIENT
Start: 2024-01-04 | End: 2024-03-24

## 2024-01-04 RX ORDER — NALTREXONE HYDROCHLORIDE 50 MG/1
50 TABLET, FILM COATED ORAL DAILY
Qty: 15 TABLET | Refills: 1 | Status: SHIPPED | OUTPATIENT
Start: 2024-01-04 | End: 2024-02-16

## 2024-01-04 NOTE — PATIENT INSTRUCTIONS
Instructions for increasing the bupropion and naltrexone:    You will take the naltrexone and bupropion together. Take with food.     First Week:  -Increase Bupropion  mg tablet: 2 tablets by mouth in AM and 1 tablet in early PM  -Increase Naltrexone 50 mg tablet: one-half (1/2) tablet by mouth in AM and one-quarter (1/4) tablet in early PM (if able to split into 1/4ths, otherwise can omit this dose)     Second Week:  -Increase to Bupropion  mg tablet: 2 tablets by mouth twice daily (AM and early PM)  -Increase to Naltrexone 50 mg tablet: one-half (1/2) tablet twice daily (AM and early PM)

## 2024-01-04 NOTE — PROGRESS NOTES
Assessment & Plan     Morbid obesity (H)  Taking split contrave (bupropion/wellbutrin) without significant side effects. She is currently taking bupropion 100 mg twice a day and naltrexone 25 mg daily (difficult to split tablet into fourths).  Blood pressure and heart rate normal today.  She feels the medication is helping her to feel in control of her cravings.  She has lost 4 kg, 3.5% of weight.  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  Will increase dose of bupropion and naltrexone further, titrate up to bupropion 200 mg  twice a day and naltrexone 25 mg twice a day (instructions below).  If she begins to have side effects from this higher dose, we can decrease back to her current dose.  Check CMP today.  Discussed today the lack of evidence surrounding long-term use of Contrave and what happens when this medication is stopped as well as the uncertainty about cardiovascular side effects.  She would like to continue the medication for 6 months and reassess at that time, which is a good plan. Follow-up in 2 to 3 months.    - buPROPion (WELLBUTRIN SR) 100 MG 12 hr tablet; Take 2 tablets (200 mg) by mouth 2 times daily  - naltrexone (DEPADE/REVIA) 50 MG tablet; Take 1 tablet (50 mg) by mouth daily  - Comprehensive metabolic panel (BMP + Alb, Alk Phos, ALT, AST, Total. Bili, TP); Future  - Comprehensive metabolic panel (BMP + Alb, Alk Phos, ALT, AST, Total. Bili, TP)      Titration of contrave:  You will take the naltrexone and bupropion together. Take with food.     First Week:  -Increase Bupropion  mg tablet: 2 tablets by mouth in AM and 1 tablet in early PM  -Increase Naltrexone 50 mg tablet: one-half (1/2) tablet by mouth in AM and one-quarter (1/4) tablet in early PM (if able to split into 1/4ths, otherwise can omit this dose)     Second Week:  -Increase to Bupropion  mg tablet: 2  "tablets by mouth twice daily (AM and early PM)  -Increase to Naltrexone 50 mg tablet: one-half (1/2) tablet twice daily (AM and early PM)                   Susan Barboza MD  [facility] SHAQUILLE Glaser is a 33 year old, presenting for the following health issues:  Follow Up (meds)      1/4/2024     7:07 AM   Additional Questions   Roomed by Lisa LONG       History of Present Illness       Reason for visit:  Follow up on new meds    She eats 2-3 servings of fruits and vegetables daily.She consumes 1 sweetened beverage(s) daily.She exercises with enough effort to increase her heart rate 30 to 60 minutes per day.  She exercises with enough effort to increase her heart rate 3 or less days per week.   She is taking medications regularly.       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      Objective    /73   Pulse 85   Temp 98.8  F (37.1  C) (Oral)   Resp 16   Ht 1.655 m (5' 5.16\")   Wt 110.2 kg (243 lb)   LMP 12/12/2023 (Exact Date)   SpO2 97%   Breastfeeding No   BMI 40.24 kg/m    Body mass index is 40.24 kg/m .  Wt Readings from Last 3 Encounters:   01/04/24 110.2 kg (243 lb)   11/06/23 114.2 kg (251 lb 12.8 oz)   11/08/22 115.5 kg (254 lb 9.6 oz)     BP Readings from Last 3 Encounters:   01/04/24 106/73   11/06/23 105/74 "   11/08/22 108/66         Physical Exam   General: well-appearing, no acute distress  HEENT: normocephalic, atraumatic, mucous membranes moist, no cervical lymphadenopathy, thyroid not enlarged  Eyes: conjunctivae normal  Neck: normal ROM, supple  Cardiovascular: regular rate and rhythm, normal S1 and S2, no murmurs/rubs/gallops  Pulmonary: no increased work of breathing, clear to auscultation bilaterally, no wheezes/rales/rhonchi  Musculoskeletal: normal ROM, no deformity  Neurological: gross motor exam normal  Skin: no rashes or lesions

## 2024-01-15 NOTE — PROGRESS NOTES
[facility]  [address] [place] [address]  Phone: [phone], Fax: [phone]    Outpatient Visit Note:    Patient: Jailyn Mendenhall  MRN: [id number]  : 1990  KELLY: 2023    Reason for Consultation:  Jailyn Mendenhall is a referred for evaluation and management of protein C deficiency.    Assessment:  In summary, Jailyn Mendenhall is a 32 year old  female with past medical history significant for obesity, asthma, neprholithiasis, and protein C deficiency (baseline level of 51%). She presents today in consultation for further evaluation and management of protein C deficiency. She has no prior history of venous thromboembolism. Her father had extensive venous thromboembolism in his late 20's in setting of testicular cancer and was later found to also have protein C deficiency. This prompted her testing. She has had challenges of cOCP use, pregnancy and minor surgeries in the past without venous thromboembolism complications. She has used Lovenox previously and tolerated it well.     Plan:  1. Majority of today's visit was spent counseling the patient regarding protein C deficiency. I counseled her that   protein C deficiency affects about 0.2-0.5% of the general population. Of those who experience venous thromboembolism, about 2-5% are found to have heterozygosity for protein C genetic mutation.  Having one abnormal copy of the PROC gene or heterozygous for protein C deficiency increases the risk of thrombosis seven fold. Protein C deficiency can be associated with venous and arterial thromboembolism. It is recommended to avoid exogenous estrogen and to be on prophylactic anticoagulation during pregnancies/postpartum and after major surgeries with associated immobilization. Patients should work with their primary care provider for risk factor modification for cardiovascular disease.   2. Aspirin is not recommended per the guidelines for primary prevention of  venous or arterial thromboembolism.    3. Regular exercise, avoidance of prolonged immobility, staying hydrated, and wearing compression garments may help mitigate development of venous thromboembolism.  4. Reviewed signs/symptoms of venous thromboembolism and when to present for evaluation   5. Recommended compression garments and in flight exercises for times of prolonged travel.   6. She should notify the office if she has any planned surgeries or procedures to discuss need for pharmacological venous thromboembolism prophylaxis. If she has any subsequent pregnancies, would recommend Lovenox.           UpToDate 2022      The patient is given our center's contact information and is instructed to call if she should have any further questions or concerns.  Otherwise, we will plan on seeing her back as needed.      Patient understands and agrees with the above plan and recommendation.      Rani Austin, MPH, PA-C  [facility]    45 minutes spent by me on the date of the encounter doing chart review, review of outside records, review of test results, interpretation of tests, patient visit, and documentation     ----------------------------------------------------------------------------------------------------------------------  History of Present Illness:  Jailyn Mendenhall (Bethany) is a 33 year old female with past medical history significant for obesity, asthma, neprholithiasis, and protein C deficiency (baseline level of 51%).     Harriet notes that her father presented to the ED at age 28/29 with significant leg pain, disocoloration and edema and was found to have extensive lower extremity deep vein thrombosis. He was placed on heparin and eventually transitioned to warfarin. Unfortunately, he was also diagnosed with testicular cancer at that same time. He has been on anticoagulation lifelong. He eventually had hypercoagulable testing that revealed  protein C deficiency off warfarin. He is now on a DOAC and has not had recurrent clotting.     This prompted Harriet to get testing. She has three other siblings. She and one of her brothers were found to have low protein C levels. One was confirmed not to have it. The other sibling has not had testing due to lack of insurance. None of her siblings or her have had any venous thromboembolism events.     She was on cOCP in her teenage years and discontinued use at age 23. She was placed on baby aspirin for all three of her pregnancies and was placed on Lovenox for 6 weeks postpartum. She had vaginal deliveries. She tolerated it well without any clotting concerns. She notes she was given a higher dose and did have some heavy lochia but then her dose was adjusted and symptoms resolved.     Other surgical history includes ureteroscopy, stone removal and stent placement 11/11/2022 and wisdom tooth extraction and tonisllectomy and adenoidectomy in her adolescence. She did not have any venous thromboembolism prophylaxis for any of these procedures.     She does not smoke. She does not have significant varicosities. She is up to date with her age appropriate malignancy screenings.     One of her three children had surgery and had normal protein C level. The other two have not had testing. Pediatrician is aware and plans to test in teenage years.       Past Medical History:  Past Medical History:   Diagnosis Date    Protein C deficiency (H)     Uncomplicated asthma        Past Surgical History:  Past Surgical History:   Procedure Laterality Date    TONSILLECTOMY & ADENOIDECTOMY      7-8 years    WISDOM TOOTH EXTRACTION      17-18 years       Medications:  Current Outpatient Medications   Medication Sig Dispense Refill    aspirin (ASA) 81 MG EC tablet Take 81 mg by mouth      ibuprofen (ADVIL/MOTRIN) 600 MG tablet Take 600 mg by mouth      ipratropium-albuterol (COMBIVENT RESPIMAT)  MCG/ACT inhaler Inhale 1 puff into the  lungs 4 times daily as needed      multivitamin (ONE-DAILY) tablet Take 1 tablet by mouth every morning      ondansetron (ZOFRAN ODT) 4 MG ODT tab Take 4 mg by mouth      oxyCODONE (ROXICODONE) 5 MG tablet Take 5 mg by mouth      tamsulosin (FLOMAX) 0.4 MG capsule Take 0.4 mg by mouth daily          Allergies:  No Known Allergies    ROS:  Remainder of a comprehensive 14 point ROS is negative unless noted above.    Social History:  Lives in [place]. [place] three children.   Denies any tobacco use. No significant alcohol use. Denies any illicit drug use.     Family History:  Protein C deficiency in father, also had testicular cancer   She has two brothers and one sister. Sister tested negative for PCD. One brother had PCD. The other brother has not tested. None have had clotting.     Objectives:  Vitals: /72 (BP Location: Right arm, Patient Position: Sitting, Cuff Size: Adult Large)   Pulse 84   Temp 97.3  F (36.3  C) (Tympanic)   Wt 110 kg (242 lb 8 oz)   LMP 12/12/2023 (Exact Date)   SpO2 96%   BMI 40.16 kg/m       Exam:   Constitutional: Appears well, no distress  HEENT: Pupils equal and round. No scleral icterus or hemorrhage.   Respiratory: no increased work of breathing.   Mus/Skele: no edema of lower extremities  Skin: no petechiae, no ecchymosis on exposed dermis.  Neuro: CN II-XII intact. Normal gait. AOx3      Labs:  CBC RESULTS: No results for input(s): WBC, RBC, HGB, HCT, MCV, MCH, MCHC, RDW, PLT in the last 95044 hours.  Last Comprehensive Metabolic Panel:  Glucose   Date Value Ref Range Status   08/15/2022 100 (H) 70 - 99 mg/dL Final     Liver Function Studies - No results for input(s): PROTTOTAL, ALBUMIN, BILITOTAL, ALKPHOS, AST, ALT, BILIDIRECT in the last 94853 hours.      Imaging:  No results found for this or any previous visit (from the past 744 hour(s)).

## 2024-01-17 ENCOUNTER — OFFICE VISIT (OUTPATIENT)
Dept: HEMATOLOGY | Facility: CLINIC | Age: 34
End: 2024-01-17
Attending: FAMILY MEDICINE
Payer: COMMERCIAL

## 2024-01-17 VITALS
WEIGHT: 242.5 LBS | OXYGEN SATURATION: 96 % | SYSTOLIC BLOOD PRESSURE: 118 MMHG | HEART RATE: 84 BPM | DIASTOLIC BLOOD PRESSURE: 72 MMHG | TEMPERATURE: 97.3 F | BODY MASS INDEX: 40.16 KG/M2

## 2024-01-17 DIAGNOSIS — Z83.2 FAMILY HISTORY OF PROTEIN C DEFICIENCY: ICD-10-CM

## 2024-01-17 DIAGNOSIS — D68.8 HEREDITARY PROTEIN C DEFICIENCY (H): Primary | ICD-10-CM

## 2024-01-17 PROCEDURE — 99213 OFFICE O/P EST LOW 20 MIN: CPT | Performed by: PHYSICIAN ASSISTANT

## 2024-01-17 PROCEDURE — 99204 OFFICE O/P NEW MOD 45 MIN: CPT | Performed by: PHYSICIAN ASSISTANT

## 2024-01-17 NOTE — PATIENT INSTRUCTIONS
[facility]  [facility]  [facility] [address], [place] [address]  Main: [phone], Fax: [phone]    Harriet,  It was a pleasure seeing you today.  Thank you for allowing us to be involved in your care.  Please let us know if there is anything else we can do for you, so that we can be sure you are leaving completely satisfied with your care experience.    Protein C deficiency affects about 0.2-0.5% of the general population. Of those who experience venous thromboembolism, about 2-5% are found to have heterozygosity for protein C genetic mutation.  Having one abnormal copy of the PROC gene or heterozygous for protein C deficiency increases the risk of thrombosis seven fold. Protein C deficiency can be associated with venous and arterial thromboembolism. It is recommended to avoid exogenous estrogen and to be on prophylactic anticoagulation during pregnancies/postpartum and after major surgeries with associated immobilization. Patients should work with their primary care provider for risk factor modification for cardiovascular disease (obesity, high blood pressure, cholesterol, diabetes).  Aspirin is not recommended per the guidelines for primary prevention of venous or arterial thromboembolism.    Regular exercise, avoidance of prolonged immobility, staying hydrated, and wearing compression garments may help mitigate development of venous thromboembolism.    Wear compression stockings if you have swelling in your leg. Take them off while you are sleeping. You may need to get new stockings every 3-6 months with regular wear.    Patient Education & Resources:  For additional information, please see the following web links:  www.stoptheclot.org, www.clotconnect.org.    Call the [facility]  at [phone].     -If surgeries or procedures are planned (for holding instructions).     -If off anticoagulation, please call  during high risk times (long-distance travel, broken bones or trauma, immobilization, surgery, pregnancy, or taking estrogen).     -Any new symptoms of DVT (deep vein thrombosis) or PE (pulmonary embolism)    -pain     -swelling     -redness    -warmth    -shortness of breath    -chest pain    -coughing up blood    Call if you have any procedures or surgeries planned or if you become pregnant to discuss need for blood thinners.     We would like a provider on our team to see you at least annually for optimal care and to allow us to continue to prescribe for you.     Return to clinic in  as needed    Your nurse clinician is April [phone].   If they are unavailable and you have immediate concerns, please call [phone] and ask for a nurse.         Rani Austin, MPH, PA-C  [facility]

## 2024-02-16 ENCOUNTER — MYC REFILL (OUTPATIENT)
Dept: FAMILY MEDICINE | Facility: CLINIC | Age: 34
End: 2024-02-16
Payer: COMMERCIAL

## 2024-02-16 DIAGNOSIS — E66.01 MORBID OBESITY (H): ICD-10-CM

## 2024-02-19 RX ORDER — NALTREXONE HYDROCHLORIDE 50 MG/1
50 TABLET, FILM COATED ORAL DAILY
Qty: 15 TABLET | Refills: 2 | Status: SHIPPED | OUTPATIENT
Start: 2024-02-19

## 2024-03-22 NOTE — PROGRESS NOTES
3/25/2024    Virtual Visit Details    Type of service:  Telephone Visit   Phone call duration: 36 minutes   Originating Location (pt. Location): Home  Distant Location (provider location):  Off-site    Referring Provider: Susan Barboza MD    Present for Today's Visit: Jailyn    Presenting Information:   I met with Jailyn Mendenhall today for genetic counseling (telephone visit due to covid19) to discuss her family history of cancer.  She is here today to review this history, cancer screening recommendations, and available genetic testing options.    Personal History:  Jailyn is a 33 year old female. She does not have any personal history of cancer. She has a history of protein C deficiency.       She had her first menstrual period at age 12, her first child at age 26, and is premenopausal.  Jailyn has her ovaries, fallopian tubes and uterus in place.  She reports past history of oral contraceptive use for less than 5 years and that she has never been on hormone replacement therapy.    She has not yet started regular mammogram screening given her age. She has not yet started colonoscopy screening given her age. She does not regularly do any other cancer screening at this time.  Jailyn reported no tobacco use, and less than one drink per week for alcohol use.    Family History: Cancer family history and pertinent information reviewed below (Please see scanned pedigree for detailed family history information)  Father, age 53, with a history of testicular cancer diagnosed at age 30; treatment included resection and chemotherapy. He has a history of 10-19 colon polyps. He also had protein C deficiency.   Paternal grandmother had a history of colon cancer diagnosed in her early 50's.   Paternal grandmother's sister with a history of breast cancer diagnosed before age 50.   Paternal grandmother's father passed from an aggressive testicular cancer at age 29.   Maternal grandmother with a history of  non-Hodgkin's lymphoma diagnosed in her 70's. She also has a history of 2-5 colon polyps.   Jailyn reports that 6 of her maternal grandmother's 12 siblings passed with histories of cancer (unknown types)  Maternal grandmother's mother had breast cancer history.  Maternal grandmother's father passed from and unknown type of cancer.   Maternal grandfather passed at age 84 from lung cancer diagnosed at age 80, no history of smoking but [place] farm and was a jensen.   Jailyn reports that three of her maternal grandfather's eleven siblings passed with histories of cancer (unknown types)  Maternal grandfather's mother and father both had histories of unknown types of cancer.   There is no known Ashkenazi Restoration ancestry on either side of her family. There is no reported consanguinity.    Discussion:  Jailyn's family history of may be is suggestive of a hereditary cancer syndrome.  We reviewed the features of sporadic, familial, and hereditary cancers. In looking at Jailyn's family history, it is possible that a cancer susceptibility gene is present due to her father's colon polyp history, her paternal grandmother's early-onset colon cancer, and her paternal great-aunt's early-onset breast cancer. Additionally, she has extensive family history on her maternal side, however, she doesn't know the specific types of cancers for many of these relatives.   We discussed the natural history and genetics of hereditary cancers. A detailed handout regarding the information we discussed will be sent to Jailyn via [employer]. Topics included: inheritance pattern, cancer risks, cancer screening recommendations, and also risks, benefits and limitations of testing.  We also discussed that it is always most informative to test the people in the family who have had the cancers concerning for a hereditary component or the people most closely related to those individuals. For Jailyn's family, this would be her parents, more  specifically her father, as he meets NCCN criteria for genetic testing. We reviewed the implications of interpreting a negative genetic test results in an unaffected person. Jailyn expressed understanding and shared that she will talk with her father about him pursuing genetic testing.   I explained that it would also be very helpful if Jailyn's mother would be able to get any additional information about the types of cancers her relative's had. I explained there are certain types of cancers that we get more concerned about from a hereditary perspective (including but not limited to breast, ovarian, endometrial, colon, pancreatic, stomach, and prostate cancers).    Plan:  1) No testing ordered today.  2) Jailyn will talk with her father about him pursuing genetic testing first. She will talk with her mother about obtaining more information about the cancers on her side of the family.   3) Jailyn was encouraged to reach out with her father's genetic testing results and with additional information about her maternal family history solidify a testing/screening plan for her.     Kay Larsen MS, [facility] [place]  Licensed, Certified Genetic Counselor  [facility]  John[facility]

## 2024-03-24 ENCOUNTER — MYC REFILL (OUTPATIENT)
Dept: FAMILY MEDICINE | Facility: CLINIC | Age: 34
End: 2024-03-24
Payer: COMMERCIAL

## 2024-03-24 DIAGNOSIS — E66.01 MORBID OBESITY (H): ICD-10-CM

## 2024-03-25 ENCOUNTER — VIRTUAL VISIT (OUTPATIENT)
Dept: ONCOLOGY | Facility: CLINIC | Age: 34
End: 2024-03-25
Attending: FAMILY MEDICINE
Payer: COMMERCIAL

## 2024-03-25 DIAGNOSIS — Z80.0 FAMILY HISTORY OF COLON CANCER: ICD-10-CM

## 2024-03-25 DIAGNOSIS — Z80.43 FAMILY HISTORY OF TESTICULAR CANCER: ICD-10-CM

## 2024-03-25 DIAGNOSIS — Z80.3 FAMILY HISTORY OF MALIGNANT NEOPLASM OF BREAST: Primary | ICD-10-CM

## 2024-03-25 PROCEDURE — 96040 HC GENETIC COUNSELING, EACH 30 MINUTES: CPT | Mod: TEL,95 | Performed by: GENETIC COUNSELOR, MS

## 2024-03-25 RX ORDER — BUPROPION HYDROCHLORIDE 100 MG/1
200 TABLET, EXTENDED RELEASE ORAL 2 TIMES DAILY
Qty: 120 TABLET | Refills: 3 | Status: SHIPPED | OUTPATIENT
Start: 2024-03-25

## 2024-03-25 NOTE — PATIENT INSTRUCTIONS
Conditions discussed today:  Hereditary cancer syndromes    Assessing Cancer Risk  Only about 5-10% of cancers are thought to be due to an inherited cancer susceptibility gene.    These families often have:  Several people with the same or related types of cancer  Cancers diagnosed at a young age (before age 50)  Individuals with more than one primary cancer  Multiple generations of the family affected with cancer    Genetic Testing  Genetic testing involves a blood or saliva test and will look at the genetic information in select genes for any harmful mutations that are associated with increased cancer risk.  If possible, it is recommended that the person(s) who has had cancer be tested before other family members.  That person will give us the most useful information about whether or not a specific gene is associated with the cancer in the family.     Results  There are three possible results of genetic testing:  Positive--a harmful mutation was identified  Negative--no mutation was identified  Variant of unknown significance--a variation in one of the genes was identified, but it is unclear how this impacts cancer risk in the family    Advantages and Disadvantages  There are advantages and disadvantages to genetic testing.    Advantages  May clarify your cancer risk  Can help you make medical decisions  May explain the cancers in your family  May give useful information to your family members (if you share your results)    Disadvantages  Possible negative emotional impact of learning about inherited cancer risk  Uncertainty in interpreting a negative test result in some situations  Possible genetic discrimination concerns (see below)    Inheritance   Mutations in most cancer risk genes are inherited in an autosomal dominant pattern.  This means that if a parent has a mutation, each of their children will have a 50% chance of inheriting that same mutation.  Therefore, each child would have a 50% chance of being  at increased risk for developing cancer.                                              Image obtained from Genetics Home Reference, 2013     Genetic Information Nondiscrimination Act (JESSICA)  JESSICA is a federal law that protects individuals from health insurance or employment discrimination based on a genetic test result alone.  Although rare, there are currently no legal protections in terms of life insurance, long term care, or disability insurances.  Visit the National Human [employer] Research [place] at Genome.gov/61148439 to learn more.    Reducing Cancer Risk  If a harmful mutation is found in a cancer risk gene, there may be certain screening tests or preventative surgeries that can be offered. This information will be discussed after genetic testing is completed. If no mutations are found on genetic testing, screening is then recommended based on personal and/or family history of cancer.     Questions to Think About Regarding Genetic Testing  What effect will the test result have on me and my relationship with my family members if I have an inherited gene mutation?  If I don t have a gene mutation?  Should I share my test results, and how will my family react to this news, which may also affect them?  Are my children ready to learn new information that may one day affect their own health?    Resources  American Cancer Society (ACS) cancer.org   National Cancer [place] (NCI) cancer.gov     Please call us if you have any questions or concerns.   Cancer Risk Management Program [phone] ([phone])

## 2024-03-25 NOTE — NURSING NOTE
Is the patient currently in the state of [place]? YES    Visit mode:TELEPHONE    If the visit is dropped, the patient can be reconnected by: TELEPHONE VISIT: Phone number:   Telephone Information:   Mobile [phone]       Will anyone else be joining the visit? NO  (If patient encounters technical issues they should call [phone]    How would you like to obtain your AVS? MyChart    Are changes needed to the allergy or medication list? No    Reason for visit: Consult    Latricia MCKEON

## 2024-03-25 NOTE — LETTER
3/25/2024         RE: Jailyn Mendenhall  [address] [facility] [id number]        Dear Colleague,    Thank you for referring your patient, Jailyn Mendenhall, to the [facility]. Please see a copy of my visit note below.    3/25/2024    Virtual Visit Details    Type of service:  Telephone Visit   Phone call duration: 36 minutes   Originating Location (pt. Location): Home  Distant Location (provider location):  Off-site    Referring Provider: Susan Barboza MD    Present for Today's Visit: Jailyn    Presenting Information:   I met with Jailyn Mendenhall today for genetic counseling (telephone visit due to covid19) to discuss her family history of cancer.  She is here today to review this history, cancer screening recommendations, and available genetic testing options.    Personal History:  Jailyn is a 33 year old female. She does not have any personal history of cancer. She has a history of protein C deficiency.       She had her first menstrual period at age 12, her first child at age 26, and is premenopausal.  Jailyn has her ovaries, fallopian tubes and uterus in place.  She reports past history of oral contraceptive use for less than 5 years and that she has never been on hormone replacement therapy.    She has not yet started regular mammogram screening given her age. She has not yet started colonoscopy screening given her age. She does not regularly do any other cancer screening at this time.  Jailyn reported no tobacco use, and less than one drink per week for alcohol use.    Family History: Cancer family history and pertinent information reviewed below (Please see scanned pedigree for detailed family history information)  Father, age 53, with a history of testicular cancer diagnosed at age 30; treatment included resection and chemotherapy. He has a history of 10-19 colon polyps. He also had protein C deficiency.   Paternal grandmother had a history of colon  cancer diagnosed in her early 50's.   Paternal grandmother's sister with a history of breast cancer diagnosed before age 50.   Paternal grandmother's father passed from an aggressive testicular cancer at age 29.   Maternal grandmother with a history of non-Hodgkin's lymphoma diagnosed in her 70's. She also has a history of 2-5 colon polyps.   Jailyn reports that 6 of her maternal grandmother's 12 siblings passed with histories of cancer (unknown types)  Maternal grandmother's mother had breast cancer history.  Maternal grandmother's father passed from and unknown type of cancer.   Maternal grandfather passed at age 84 from lung cancer diagnosed at age 80, no history of smoking but [place] farm and was a jensen.   Jailyn reports that three of her maternal grandfather's eleven siblings passed with histories of cancer (unknown types)  Maternal grandfather's mother and father both had histories of unknown types of cancer.   There is no known Ashkenazi Confucianism ancestry on either side of her family. There is no reported consanguinity.    Discussion:  Jailyn's family history of may be is suggestive of a hereditary cancer syndrome.  We reviewed the features of sporadic, familial, and hereditary cancers. In looking at Jailyn's family history, it is possible that a cancer susceptibility gene is present due to her father's colon polyp history, her paternal grandmother's early-onset colon cancer, and her paternal great-aunt's early-onset breast cancer. Additionally, she has extensive family history on her maternal side, however, she doesn't know the specific types of cancers for many of these relatives.   We discussed the natural history and genetics of hereditary cancers. A detailed handout regarding the information we discussed will be sent to Jailyn via [employer]. Topics included: inheritance pattern, cancer risks, cancer screening recommendations, and also risks, benefits and limitations of testing.  We also  discussed that it is always most informative to test the people in the family who have had the cancers concerning for a hereditary component or the people most closely related to those individuals. For Jailyn's family, this would be her parents, more specifically her father, as he meets NCCN criteria for genetic testing. We reviewed the implications of interpreting a negative genetic test results in an unaffected person. Jailyn expressed understanding and shared that she will talk with her father about him pursuing genetic testing.   I explained that it would also be very helpful if Jailyn's mother would be able to get any additional information about the types of cancers her relative's had. I explained there are certain types of cancers that we get more concerned about from a hereditary perspective (including but not limited to breast, ovarian, endometrial, colon, pancreatic, stomach, and prostate cancers).    Plan:  1) No testing ordered today.  2) Jailyn will talk with her father about him pursuing genetic testing first. She will talk with her mother about obtaining more information about the cancers on her side of the family.   3) Jailyn was encouraged to reach out with her father's genetic testing results and with additional information about her maternal family history solidify a testing/screening plan for her.     Kay Larsen MS, [facility]  Licensed, Certified Genetic Counselor  [facility]  [email or web address]        Note created in error- duplicate note      Again, thank you for allowing me to participate in the care of your patient.        Sincerely,        Kay Beard GC

## 2024-10-01 ENCOUNTER — OFFICE VISIT (OUTPATIENT)
Dept: FAMILY MEDICINE | Facility: CLINIC | Age: 34
End: 2024-10-01
Payer: COMMERCIAL

## 2024-10-01 VITALS
SYSTOLIC BLOOD PRESSURE: 115 MMHG | BODY MASS INDEX: 39.32 KG/M2 | RESPIRATION RATE: 18 BRPM | TEMPERATURE: 97.8 F | HEART RATE: 77 BPM | WEIGHT: 236 LBS | DIASTOLIC BLOOD PRESSURE: 80 MMHG | OXYGEN SATURATION: 97 % | HEIGHT: 65 IN

## 2024-10-01 DIAGNOSIS — B07.0 PLANTAR WART OF RIGHT FOOT: Primary | ICD-10-CM

## 2024-10-01 PROCEDURE — 90471 IMMUNIZATION ADMIN: CPT | Performed by: FAMILY MEDICINE

## 2024-10-01 PROCEDURE — 99213 OFFICE O/P EST LOW 20 MIN: CPT | Mod: 25 | Performed by: FAMILY MEDICINE

## 2024-10-01 PROCEDURE — 90480 ADMN SARSCOV2 VAC 1/ONLY CMP: CPT | Performed by: FAMILY MEDICINE

## 2024-10-01 PROCEDURE — 91320 SARSCV2 VAC 30MCG TRS-SUC IM: CPT | Performed by: FAMILY MEDICINE

## 2024-10-01 PROCEDURE — 90656 IIV3 VACC NO PRSV 0.5 ML IM: CPT | Performed by: FAMILY MEDICINE

## 2024-10-01 ASSESSMENT — ASTHMA QUESTIONNAIRES
QUESTION_5 LAST FOUR WEEKS HOW WOULD YOU RATE YOUR ASTHMA CONTROL: COMPLETELY CONTROLLED
ACT_TOTALSCORE: 25
QUESTION_4 LAST FOUR WEEKS HOW OFTEN HAVE YOU USED YOUR RESCUE INHALER OR NEBULIZER MEDICATION (SUCH AS ALBUTEROL): NOT AT ALL
QUESTION_2 LAST FOUR WEEKS HOW OFTEN HAVE YOU HAD SHORTNESS OF BREATH: NOT AT ALL
QUESTION_1 LAST FOUR WEEKS HOW MUCH OF THE TIME DID YOUR ASTHMA KEEP YOU FROM GETTING AS MUCH DONE AT WORK, SCHOOL OR AT HOME: NONE OF THE TIME
QUESTION_3 LAST FOUR WEEKS HOW OFTEN DID YOUR ASTHMA SYMPTOMS (WHEEZING, COUGHING, SHORTNESS OF BREATH, CHEST TIGHTNESS OR PAIN) WAKE YOU UP AT NIGHT OR EARLIER THAN USUAL IN THE MORNING: NOT AT ALL
ACT_TOTALSCORE: 25

## 2024-10-01 NOTE — PROGRESS NOTES
"  {PROVIDER CHARTING PREFERENCE:142001}    Wes Glaser is a 33 year old, presenting for the following health issues:  Sore (On bottom of foot, treated it as a wart and it started turning colors, rough, first noticed it about a year ago, no pain, right foot )      10/1/2024     3:58 PM   Additional Questions   Roomed by Tia     History of Present Illness       Reason for visit:  Sore on foot thst isnt resoonding to treatment  Symptom onset:  More than a month  Symptoms include:  Sore on foot  Symptom intensity:  Mild  Symptom progression:  Worsening  Had these symptoms before:  No  What makes it worse:  Not really  What makes it better:  Not really   She is taking medications regularly.       {MA/LPN/RN Pre-Provider Visit Orders- hCG/UA/Strep (Optional):708756}  {SUPERLIST (Optional):105563}  {additonal problems for provider to add (Optional):985356}    {ROS Picklists (Optional):062195}      Objective    /80 (BP Location: Right arm, Patient Position: Sitting, Cuff Size: Adult Large)   Pulse 77   Temp 97.8  F (36.6  C) (Temporal)   Resp 18   Ht 1.651 m (5' 5\")   Wt 107 kg (236 lb)   LMP 09/14/2024 (Exact Date)   SpO2 97%   BMI 39.27 kg/m    Body mass index is 39.27 kg/m .  Physical Exam   {Exam List (Optional):132981}    {Diagnostic Test Results (Optional):134312}      Prior to immunization administration, verified patients identity using patient s name and date of birth. Please see Immunization Activity for additional information.     Screening Questionnaire for Adult Immunization    Are you sick today?   Don't Know   Do you have allergies to medications, food, a vaccine component or latex?   No   Have you ever had a serious reaction after receiving a vaccination?   No   Do you have a long-term health problem with heart, lung, kidney, or metabolic disease (e.g., diabetes), asthma, a blood disorder, no spleen, complement component deficiency, a cochlear implant, or a spinal fluid leak?  Are you " on long-term aspirin therapy?   Yes   Do you have cancer, leukemia, HIV/AIDS, or any other immune system problem?   No   Do you have a parent, brother, or sister with an immune system problem?   No   In the past 3 months, have you taken medications that affect  your immune system, such as prednisone, other steroids, or anticancer drugs; drugs for the treatment of rheumatoid arthritis, Crohn s disease, or psoriasis; or have you had radiation treatments?   No   Have you had a seizure, or a brain or other nervous system problem?   No   During the past year, have you received a transfusion of blood or blood    products, or been given immune (gamma) globulin or antiviral drug?   No   For women: Are you pregnant or is there a chance you could become       pregnant during the next month?   Yes   Have you received any vaccinations in the past 4 weeks?   No     Immunization questionnaire was positive for at least one answer.  Notified .      Patient instructed to remain in clinic for 15 minutes afterwards, and to report any adverse reactions.     Screening performed by Elyse Akers CMA on 10/1/2024 at 4:00 PM.        Signed Electronically by: Nikki Gauthier MD  {Email feedback regarding this note to [email or web address]   :[id number]}   seizure, or a brain or other nervous system problem?   No   During the past year, have you received a transfusion of blood or blood    products, or been given immune (gamma) globulin or antiviral drug?   No   For women: Are you pregnant or is there a chance you could become       pregnant during the next month?   Yes   Have you received any vaccinations in the past 4 weeks?   No     Immunization questionnaire was positive for at least one answer.  Notified .      Patient instructed to remain in clinic for 15 minutes afterwards, and to report any adverse reactions.     Screening performed by Elyse Akers CMA on 10/1/2024 at 4:00 PM.        Signed Electronically by: Nikki Gauthier MD

## 2025-01-15 ENCOUNTER — OFFICE VISIT (OUTPATIENT)
Dept: FAMILY MEDICINE | Facility: CLINIC | Age: 35
End: 2025-01-15
Payer: COMMERCIAL

## 2025-01-15 VITALS
BODY MASS INDEX: 39.87 KG/M2 | DIASTOLIC BLOOD PRESSURE: 80 MMHG | SYSTOLIC BLOOD PRESSURE: 124 MMHG | HEIGHT: 65 IN | OXYGEN SATURATION: 98 % | HEART RATE: 89 BPM | WEIGHT: 239.3 LBS | TEMPERATURE: 98.1 F | RESPIRATION RATE: 15 BRPM

## 2025-01-15 DIAGNOSIS — J45.20 MILD INTERMITTENT ASTHMA WITHOUT COMPLICATION: ICD-10-CM

## 2025-01-15 DIAGNOSIS — E66.01 MORBID OBESITY (H): ICD-10-CM

## 2025-01-15 DIAGNOSIS — D68.8: ICD-10-CM

## 2025-01-15 DIAGNOSIS — B00.1 RECURRENT HERPES LABIALIS: ICD-10-CM

## 2025-01-15 DIAGNOSIS — Z00.00 ROUTINE GENERAL MEDICAL EXAMINATION AT A HEALTH CARE FACILITY: Primary | ICD-10-CM

## 2025-01-15 PROBLEM — N20.1 URETERAL STONE: Status: RESOLVED | Noted: 2022-11-04 | Resolved: 2025-01-15

## 2025-01-15 PROCEDURE — 90677 PCV20 VACCINE IM: CPT | Performed by: FAMILY MEDICINE

## 2025-01-15 PROCEDURE — 90471 IMMUNIZATION ADMIN: CPT | Performed by: FAMILY MEDICINE

## 2025-01-15 PROCEDURE — 99395 PREV VISIT EST AGE 18-39: CPT | Mod: 25 | Performed by: FAMILY MEDICINE

## 2025-01-15 RX ORDER — ALBUTEROL SULFATE 90 UG/1
2 INHALANT RESPIRATORY (INHALATION) EVERY 6 HOURS PRN
Qty: 18 G | Refills: 1 | Status: SHIPPED | OUTPATIENT
Start: 2025-01-15

## 2025-01-15 RX ORDER — ACYCLOVIR 400 MG/1
400 TABLET ORAL EVERY 8 HOURS
Qty: 15 TABLET | Refills: 3 | Status: SHIPPED | OUTPATIENT
Start: 2025-01-15 | End: 2025-01-20

## 2025-01-15 SDOH — HEALTH STABILITY: PHYSICAL HEALTH: ON AVERAGE, HOW MANY DAYS PER WEEK DO YOU ENGAGE IN MODERATE TO STRENUOUS EXERCISE (LIKE A BRISK WALK)?: 3 DAYS

## 2025-01-15 ASSESSMENT — ASTHMA QUESTIONNAIRES
QUESTION_1 LAST FOUR WEEKS HOW MUCH OF THE TIME DID YOUR ASTHMA KEEP YOU FROM GETTING AS MUCH DONE AT WORK, SCHOOL OR AT HOME: NONE OF THE TIME
QUESTION_3 LAST FOUR WEEKS HOW OFTEN DID YOUR ASTHMA SYMPTOMS (WHEEZING, COUGHING, SHORTNESS OF BREATH, CHEST TIGHTNESS OR PAIN) WAKE YOU UP AT NIGHT OR EARLIER THAN USUAL IN THE MORNING: NOT AT ALL
QUESTION_4 LAST FOUR WEEKS HOW OFTEN HAVE YOU USED YOUR RESCUE INHALER OR NEBULIZER MEDICATION (SUCH AS ALBUTEROL): NOT AT ALL
QUESTION_5 LAST FOUR WEEKS HOW WOULD YOU RATE YOUR ASTHMA CONTROL: COMPLETELY CONTROLLED
ACT_TOTALSCORE: 25
ACT_TOTALSCORE: 25
QUESTION_2 LAST FOUR WEEKS HOW OFTEN HAVE YOU HAD SHORTNESS OF BREATH: NOT AT ALL

## 2025-01-15 ASSESSMENT — SOCIAL DETERMINANTS OF HEALTH (SDOH): HOW OFTEN DO YOU GET TOGETHER WITH FRIENDS OR RELATIVES?: ONCE A WEEK

## 2025-01-15 NOTE — PROGRESS NOTES
"Preventive Care Visit  [facility] SHAQUILLE Tenorio MD, Family Medicine  Loco 15, 2025      Assessment & Plan     Routine general medical examination at a health care facility    Hereditary protein C deficiency (H), asymptomatic: Has seen hematology- does not require anticoagulation unless having major surgery.    Morbid obesity (H): Patient took bupriopion/naltrexone for 3 months and lost ~15 lbs. Patient interested in discussing other medication options- reviewed GLP-1- she will check with insurance to see if these are covered. I offered compounding pharmacy for alternative to pay out of pocket- patient will notify me via [employer] if she desires prescription- she would not need to do an appointment for a prescription. I also offered referral to weight management clinic- patient declines at this time but will let me know if she desires referral.    Mild intermittent asthma without complication: Has not required albuterol in the last year, but will refill as her inhaler has likely .  - albuterol (PROAIR HFA/PROVENTIL HFA/VENTOLIN HFA) 108 (90 Base) MCG/ACT inhaler  Dispense: 18 g; Refill: 1    Recurrent herpes labialis: Uses acyclovir for cold sores, as needed.   - acyclovir (ZOVIRAX) 400 MG tablet  Dispense: 15 tablet; Refill: 3    Family history of cancer- reviewed genetics note            BMI  Estimated body mass index is 40.44 kg/m  as calculated from the following:    Height as of this encounter: 1.638 m (5' 4.5\").    Weight as of this encounter: 108.5 kg (239 lb 4.8 oz).       Counseling  Appropriate preventive services were addressed with this patient via screening, questionnaire, or discussion as appropriate for fall prevention, nutrition, physical activity, Tobacco-use cessation, social engagement, weight loss and cognition.  Checklist reviewing preventive services available has been given to the patient.  Reviewed patient's diet, addressing concerns and/or questions.   She is at " risk for lack of exercise and has been provided with information to increase physical activity for the benefit of her well-being.   She is at risk for psychosocial distress and has been provided with information to reduce risk.           Wes Glaser is a 34 year old, presenting for the following:  Physical        1/15/2025     3:43 PM   Additional Questions   Roomed by EDMUNDO NINO CMA   Accompanied by None          HPI    Doing well overall    Interested in discussing medication options for weight management    Health Care Directive  Patient does not have a Health Care Directive: not on file      1/15/2025   General Health   How would you rate your overall physical health? Good   Feel stress (tense, anxious, or unable to sleep) To some extent   (!) STRESS CONCERN      1/15/2025   Nutrition   Three or more servings of calcium each day? Yes   Diet: Regular (no restrictions)   How many servings of fruit and vegetables per day? 4 or more   How many sweetened beverages each day? 0-1         1/15/2025   Exercise   Days per week of moderate/strenous exercise 3 days         1/15/2025   Social Factors   Frequency of gathering with friends or relatives Once a week   Worry food won't last until get money to buy more No   Food not last or not have enough money for food? No   Do you have housing? (Housing is defined as stable permanent housing and does not include staying ouside in a car, in a tent, in an abandoned building, in an overnight shelter, or couch-surfing.) Yes   Are you worried about losing your housing? No   Lack of transportation? No   Unable to get utilities (heat,electricity)? No         1/15/2025   Dental   Dentist two times every year? Yes         1/15/2025   TB Screening   Were you born outside of the [place]? No         Today's PHQ-2 Score:       1/15/2025     3:45 PM   PHQ-2 ( 1999 Pfizer)   Q1: Little interest or pleasure in doing things 0   Q2: Feeling down, depressed or  "hopeless 1   PHQ-2 Score 1           1/15/2025   Substance Use   Alcohol more than 3/day or more than 7/wk No   Do you use any other substances recreationally? No     Social History     Tobacco Use    Smoking status: Never     Passive exposure: Never    Smokeless tobacco: Never   Vaping Use    Vaping status: Never Used   Substance Use Topics    Alcohol use: Not Currently     Comment: very rare    Drug use: Never           11/6/2023   LAST FHS-7 RESULTS   1st degree relative breast or ovarian cancer No   Any relative bilateral breast cancer No   Any male have breast cancer No   Any ONE woman have BOTH breast AND ovarian cancer Yes   Any woman with breast cancer before 50yrs Yes   2 or more relatives with breast AND/OR ovarian cancer No   2 or more relatives with breast AND/OR bowel cancer Yes                1/15/2025   STI Screening   New sexual partner(s) since last STI/HIV test? No     History of abnormal Pap smear:         Latest Ref Rng & Units 8/15/2022     8:13 AM   PAP / HPV   PAP  Negative for Intraepithelial Lesion or Malignancy (NILM)    HPV 16 DNA Negative Negative    HPV 18 DNA Negative Negative    Other HR HPV Negative Negative            1/15/2025   Contraception/Family Planning   Questions about contraception or family planning No        Reviewed and updated as needed this visit by Provider                             Objective    Exam  /80 (BP Location: Left arm, Patient Position: Sitting, Cuff Size: Adult Large)   Pulse 89   Temp 98.1  F (36.7  C) (Oral)   Resp 15   Ht 1.638 m (5' 4.5\")   Wt 108.5 kg (239 lb 4.8 oz)   LMP 12/29/2024   SpO2 98%   BMI 40.44 kg/m     Estimated body mass index is 40.44 kg/m  as calculated from the following:    Height as of this encounter: 1.638 m (5' 4.5\").    Weight as of this encounter: 108.5 kg (239 lb 4.8 oz).    Physical Exam  GENERAL: alert and no distress  EYES: Eyes grossly normal to inspection, PERRL and conjunctivae and sclerae normal  HENT: ear " canals and TM's normal, nose and mouth without ulcers or lesions  NECK: no adenopathy, no asymmetry, masses, or scars  RESP: lungs clear to auscultation - no rales, rhonchi or wheezes  CV: regular rate and rhythm, normal S1 S2, no S3 or S4, no murmur, click or rub, no peripheral edema  NEURO: Normal strength and tone, mentation intact and speech normal  PSYCH: mentation appears normal, affect normal/bright        Signed Electronically by: Lyric Tenoroi MD

## 2025-01-15 NOTE — PATIENT INSTRUCTIONS
Patient Education   Preventive Care Advice   This is general advice given by our system to help you stay healthy. However, your care team may have specific advice just for you. Please talk to your care team about your preventive care needs.  Nutrition  Eat 5 or more servings of fruits and vegetables each day.  Try wheat bread, brown rice and whole grain pasta (instead of white bread, rice, and pasta).  Get enough calcium and vitamin D. Check the label on foods and aim for 100% of the RDA (recommended daily allowance).  Lifestyle  Exercise at least 150 minutes each week  (30 minutes a day, 5 days a week).  Do muscle strengthening activities 2 days a week. These help control your weight and prevent disease.  No smoking.  Wear sunscreen to prevent skin cancer.  Have a dental exam and cleaning every 6 months.  Yearly exams  See your health care team every year to talk about:  Any changes in your health.  Any medicines your care team has prescribed.  Preventive care, family planning, and ways to prevent chronic diseases.  Shots (vaccines)   HPV shots (up to age 26), if you've never had them before.  Hepatitis B shots (up to age 59), if you've never had them before.  COVID-19 shot: Get this shot when it's due.  Flu shot: Get a flu shot every year.  Tetanus shot: Get a tetanus shot every 10 years.  Pneumococcal, hepatitis A, and RSV shots: Ask your care team if you need these based on your risk.  Shingles shot (for age 50 and up)  General health tests  Diabetes screening:  Starting at age 35, Get screened for diabetes at least every 3 years.  If you are younger than age 35, ask your care team if you should be screened for diabetes.  Cholesterol test: At age 39, start having a cholesterol test every 5 years, or more often if advised.  Bone density scan (DEXA): At age 50, ask your care team if you should have this scan for osteoporosis (brittle bones).  Hepatitis C: Get tested at least once in your life.  STIs (sexually  transmitted infections)  Before age 24: Ask your care team if you should be screened for STIs.  After age 24: Get screened for STIs if you're at risk. You are at risk for STIs (including HIV) if:  You are sexually active with more than one person.  You don't use condoms every time.  You or a partner was diagnosed with a sexually transmitted infection.  If you are at risk for HIV, ask about PrEP medicine to prevent HIV.  Get tested for HIV at least once in your life, whether you are at risk for HIV or not.  Cancer screening tests  Cervical cancer screening: If you have a cervix, begin getting regular cervical cancer screening tests starting at age 21.  Breast cancer scan (mammogram): If you've ever had breasts, begin having regular mammograms starting at age 40. This is a scan to check for breast cancer.  Colon cancer screening: It is important to start screening for colon cancer at age 45.  Have a colonoscopy test every 10 years (or more often if you're at risk) Or, ask your provider about stool tests like a FIT test every year or Cologuard test every 3 years.  To learn more about your testing options, visit:   .  For help making a decision, visit:   https://bit.ly/jb81606.  Prostate cancer screening test: If you have a prostate, ask your care team if a prostate cancer screening test (PSA) at age 55 is right for you.  Lung cancer screening: If you are a current or former smoker ages 50 to 80, ask your care team if ongoing lung cancer screenings are right for you.  For informational purposes only. Not to replace the advice of your health care provider. Copyright   2023 [facility]. All rights reserved. Clinically reviewed by the [facility]itions Program. [employer] 106312 - REV 01/24.  Learning About Stress  What is stress?     Stress is your body's response to a hard situation. Your body can have a physical, emotional, or mental response. Stress is a fact of life for most people, and it  affects everyone differently. What causes stress for you may not be stressful for someone else.  A lot of things can cause stress. You may feel stress when you go on a job interview, take a test, or run a race. This kind of short-term stress is normal and even useful. It can help you if you need to work hard or react quickly. For example, stress can help you finish an important job on time.  Long-term stress is caused by ongoing stressful situations or events. Examples of long-term stress include long-term health problems, ongoing problems at work, or conflicts in your family. Long-term stress can harm your health.  How does stress affect your health?  When you are stressed, your body responds as though you are in danger. It makes hormones that speed up your heart, make you breathe faster, and give you a burst of energy. This is called the fight-or-flight stress response. If the stress is over quickly, your body goes back to normal and no harm is done.  But if stress happens too often or lasts too long, it can have bad effects. Long-term stress can make you more likely to get sick, and it can make symptoms of some diseases worse. If you tense up when you are stressed, you may develop neck, shoulder, or low back pain. Stress is linked to high blood pressure and heart disease.  Stress also harms your emotional health. It can make you baeza, tense, or depressed. Your relationships may suffer, and you may not do well at work or school.  What can you do to manage stress?  You can try these things to help manage stress:   Do something active. Exercise or activity can help reduce stress. Walking is a great way to get started. Even everyday activities such as housecleaning or yard work can help.  Try yoga or sarmad chi. These techniques combine exercise and meditation. You may need some training at first to learn them.  Do something you enjoy. For example, listen to music or go to a movie. Practice your hobby or do volunteer  "work.  Meditate. This can help you relax, because you are not worrying about what happened before or what may happen in the future.  Do guided imagery. Imagine yourself in any setting that helps you feel calm. You can use online videos, books, or a teacher to guide you.  Do breathing exercises. For example:  From a standing position, bend forward from the waist with your knees slightly bent. Let your arms dangle close to the floor.  Breathe in slowly and deeply as you return to a standing position. Roll up slowly and lift your head last.  Hold your breath for just a few seconds in the standing position.  Breathe out slowly and bend forward from the waist.  Let your feelings out. Talk, laugh, cry, and express anger when you need to. Talking with supportive friends or family, a counselor, or a zen leader about your feelings is a healthy way to relieve stress. Avoid discussing your feelings with people who make you feel worse.  Write. It may help to write about things that are bothering you. This helps you find out how much stress you feel and what is causing it. When you know this, you can find better ways to cope.  What can you do to prevent stress?  You might try some of these things to help prevent stress:  Manage your time. This helps you find time to do the things you want and need to do.  Get enough sleep. Your body recovers from the stresses of the day while you are sleeping.  Get support. Your family, friends, and community can make a difference in how you experience stress.  Limit your news feed. Avoid or limit time on social media or news that may make you feel stressed.  Do something active. Exercise or activity can help reduce stress. Walking is a great way to get started.  Where can you learn more?  Go to https://www.[employer].net/patiented  Enter N032 in the search box to learn more about \"Learning About Stress.\"  Current as of: October 24, 2023  Content Version: 14.3    2024 [employer]. "   Care instructions adapted under license by your healthcare professional. If you have questions about a medical condition or this instruction, always ask your healthcare professional. [employer] disclaims any warranty or liability for your use of this information.